# Patient Record
Sex: MALE | Race: WHITE | URBAN - METROPOLITAN AREA
[De-identification: names, ages, dates, MRNs, and addresses within clinical notes are randomized per-mention and may not be internally consistent; named-entity substitution may affect disease eponyms.]

---

## 2022-02-09 ENCOUNTER — TELEPHONE (OUTPATIENT)
Dept: CASE MANAGEMENT | Age: 67
End: 2022-02-09

## 2022-02-15 ENCOUNTER — OFFICE VISIT (OUTPATIENT)
Dept: FAMILY MEDICINE CLINIC | Facility: CLINIC | Age: 67
End: 2022-02-15
Payer: MEDICARE

## 2022-02-15 VITALS
BODY MASS INDEX: 25.34 KG/M2 | TEMPERATURE: 97 F | DIASTOLIC BLOOD PRESSURE: 98 MMHG | WEIGHT: 177 LBS | HEIGHT: 70 IN | RESPIRATION RATE: 17 BRPM | HEART RATE: 91 BPM | SYSTOLIC BLOOD PRESSURE: 162 MMHG

## 2022-02-15 DIAGNOSIS — Z12.11 COLON CANCER SCREENING: ICD-10-CM

## 2022-02-15 DIAGNOSIS — Z00.00 ENCOUNTER FOR ANNUAL HEALTH EXAMINATION: ICD-10-CM

## 2022-02-15 DIAGNOSIS — I10 ESSENTIAL HYPERTENSION: ICD-10-CM

## 2022-02-15 DIAGNOSIS — Z00.00 ROUTINE PHYSICAL EXAMINATION: Primary | ICD-10-CM

## 2022-02-15 PROCEDURE — 96160 PT-FOCUSED HLTH RISK ASSMT: CPT | Performed by: FAMILY MEDICINE

## 2022-02-15 PROCEDURE — 99387 INIT PM E/M NEW PAT 65+ YRS: CPT | Performed by: FAMILY MEDICINE

## 2022-02-15 PROCEDURE — 3077F SYST BP >= 140 MM HG: CPT | Performed by: FAMILY MEDICINE

## 2022-02-15 PROCEDURE — 3008F BODY MASS INDEX DOCD: CPT | Performed by: FAMILY MEDICINE

## 2022-02-15 PROCEDURE — 3080F DIAST BP >= 90 MM HG: CPT | Performed by: FAMILY MEDICINE

## 2022-02-15 PROCEDURE — G0402 INITIAL PREVENTIVE EXAM: HCPCS | Performed by: FAMILY MEDICINE

## 2022-02-18 ENCOUNTER — LAB ENCOUNTER (OUTPATIENT)
Dept: LAB | Age: 67
End: 2022-02-18
Attending: FAMILY MEDICINE
Payer: MEDICARE

## 2022-02-18 DIAGNOSIS — Z00.00 ROUTINE PHYSICAL EXAMINATION: ICD-10-CM

## 2022-02-18 DIAGNOSIS — R73.09 ELEVATED GLUCOSE: ICD-10-CM

## 2022-02-18 LAB
ALBUMIN SERPL-MCNC: 4.2 G/DL (ref 3.4–5)
ALBUMIN/GLOB SERPL: 1.3 {RATIO} (ref 1–2)
ALP LIVER SERPL-CCNC: 50 U/L
ALT SERPL-CCNC: 23 U/L
ANION GAP SERPL CALC-SCNC: 7 MMOL/L (ref 0–18)
AST SERPL-CCNC: 12 U/L (ref 15–37)
BILIRUB SERPL-MCNC: 0.6 MG/DL (ref 0.1–2)
BUN BLD-MCNC: 13 MG/DL (ref 7–18)
BUN/CREAT SERPL: 15.9 (ref 10–20)
CALCIUM BLD-MCNC: 9.5 MG/DL (ref 8.5–10.1)
CHLORIDE SERPL-SCNC: 102 MMOL/L (ref 98–112)
CHOLEST SERPL-MCNC: 295 MG/DL (ref ?–200)
CO2 SERPL-SCNC: 27 MMOL/L (ref 21–32)
COMPLEXED PSA SERPL-MCNC: 0.88 NG/ML (ref ?–4)
CREAT BLD-MCNC: 0.82 MG/DL
DEPRECATED RDW RBC AUTO: 37.3 FL (ref 35.1–46.3)
ERYTHROCYTE [DISTWIDTH] IN BLOOD BY AUTOMATED COUNT: 11 % (ref 11–15)
FASTING PATIENT LIPID ANSWER: YES
FASTING STATUS PATIENT QL REPORTED: YES
GLOBULIN PLAS-MCNC: 3.2 G/DL (ref 2.8–4.4)
GLUCOSE BLD-MCNC: 343 MG/DL (ref 70–99)
HCT VFR BLD AUTO: 43 %
HDLC SERPL-MCNC: 56 MG/DL (ref 40–59)
HGB BLD-MCNC: 15.2 G/DL
LDLC SERPL CALC-MCNC: 184 MG/DL (ref ?–100)
MCH RBC QN AUTO: 32.5 PG (ref 26–34)
MCHC RBC AUTO-ENTMCNC: 35.3 G/DL (ref 31–37)
MCV RBC AUTO: 91.9 FL
NONHDLC SERPL-MCNC: 239 MG/DL (ref ?–130)
OSMOLALITY SERPL CALC.SUM OF ELEC: 296 MOSM/KG (ref 275–295)
PLATELET # BLD AUTO: 224 10(3)UL (ref 150–450)
POTASSIUM SERPL-SCNC: 4.5 MMOL/L (ref 3.5–5.1)
PROT SERPL-MCNC: 7.4 G/DL (ref 6.4–8.2)
RBC # BLD AUTO: 4.68 X10(6)UL
SODIUM SERPL-SCNC: 136 MMOL/L (ref 136–145)
TRIGL SERPL-MCNC: 284 MG/DL (ref 30–149)
VLDLC SERPL CALC-MCNC: 60 MG/DL (ref 0–30)
WBC # BLD AUTO: 6 X10(3) UL (ref 4–11)

## 2022-02-18 PROCEDURE — 36415 COLL VENOUS BLD VENIPUNCTURE: CPT

## 2022-02-18 PROCEDURE — 85027 COMPLETE CBC AUTOMATED: CPT

## 2022-02-18 PROCEDURE — 83036 HEMOGLOBIN GLYCOSYLATED A1C: CPT

## 2022-02-18 PROCEDURE — 80053 COMPREHEN METABOLIC PANEL: CPT

## 2022-02-18 PROCEDURE — 80061 LIPID PANEL: CPT

## 2022-02-19 LAB
EST. AVERAGE GLUCOSE BLD GHB EST-MCNC: 321 MG/DL (ref 68–126)
HBA1C MFR BLD: 12.8 % (ref ?–5.7)

## 2022-02-23 ENCOUNTER — OFFICE VISIT (OUTPATIENT)
Dept: FAMILY MEDICINE CLINIC | Facility: CLINIC | Age: 67
End: 2022-02-23
Payer: MEDICARE

## 2022-02-23 VITALS
DIASTOLIC BLOOD PRESSURE: 78 MMHG | SYSTOLIC BLOOD PRESSURE: 138 MMHG | TEMPERATURE: 97 F | WEIGHT: 174 LBS | BODY MASS INDEX: 24.36 KG/M2 | HEIGHT: 71 IN | RESPIRATION RATE: 1 BRPM | HEART RATE: 75 BPM

## 2022-02-23 DIAGNOSIS — E11.9 NEW ONSET TYPE 2 DIABETES MELLITUS (HCC): Primary | ICD-10-CM

## 2022-02-23 DIAGNOSIS — R03.0 ELEVATED BLOOD PRESSURE READING: ICD-10-CM

## 2022-02-23 DIAGNOSIS — E78.00 ELEVATED CHOLESTEROL: ICD-10-CM

## 2022-02-23 PROCEDURE — 99213 OFFICE O/P EST LOW 20 MIN: CPT | Performed by: FAMILY MEDICINE

## 2022-02-23 PROCEDURE — 3075F SYST BP GE 130 - 139MM HG: CPT | Performed by: FAMILY MEDICINE

## 2022-02-23 PROCEDURE — 3078F DIAST BP <80 MM HG: CPT | Performed by: FAMILY MEDICINE

## 2022-02-23 PROCEDURE — 3008F BODY MASS INDEX DOCD: CPT | Performed by: FAMILY MEDICINE

## 2022-05-04 ENCOUNTER — OFFICE VISIT (OUTPATIENT)
Dept: ENDOCRINOLOGY CLINIC | Facility: CLINIC | Age: 67
End: 2022-05-04
Payer: MEDICARE

## 2022-05-04 ENCOUNTER — TELEPHONE (OUTPATIENT)
Dept: ENDOCRINOLOGY CLINIC | Facility: CLINIC | Age: 67
End: 2022-05-04

## 2022-05-04 VITALS
DIASTOLIC BLOOD PRESSURE: 101 MMHG | SYSTOLIC BLOOD PRESSURE: 166 MMHG | BODY MASS INDEX: 24.78 KG/M2 | RESPIRATION RATE: 18 BRPM | WEIGHT: 177 LBS | HEIGHT: 71 IN | HEART RATE: 91 BPM

## 2022-05-04 DIAGNOSIS — E11.65 TYPE 2 DIABETES MELLITUS WITH HYPERGLYCEMIA, WITHOUT LONG-TERM CURRENT USE OF INSULIN (HCC): Primary | ICD-10-CM

## 2022-05-04 DIAGNOSIS — E78.5 DYSLIPIDEMIA: ICD-10-CM

## 2022-05-04 DIAGNOSIS — I10 HYPERTENSION, UNSPECIFIED TYPE: ICD-10-CM

## 2022-05-04 LAB
CARTRIDGE LOT#: ABNORMAL NUMERIC
GLUCOSE BLOOD: 173
HEMOGLOBIN A1C: 9.9 % (ref 4.3–5.6)
TEST STRIP LOT #: NORMAL NUMERIC

## 2022-05-04 PROCEDURE — 3046F HEMOGLOBIN A1C LEVEL >9.0%: CPT | Performed by: FAMILY MEDICINE

## 2022-05-04 NOTE — TELEPHONE ENCOUNTER
Hi!  Can we please send this patient an glucometer with testing supplies so that he can check his blood sugars twice daily? Thank you!

## 2022-05-05 ENCOUNTER — OFFICE VISIT (OUTPATIENT)
Dept: FAMILY MEDICINE CLINIC | Facility: CLINIC | Age: 67
End: 2022-05-05
Payer: MEDICARE

## 2022-05-05 VITALS
HEART RATE: 86 BPM | BODY MASS INDEX: 25.2 KG/M2 | DIASTOLIC BLOOD PRESSURE: 88 MMHG | SYSTOLIC BLOOD PRESSURE: 142 MMHG | TEMPERATURE: 97 F | WEIGHT: 180 LBS | HEIGHT: 71 IN | RESPIRATION RATE: 18 BRPM

## 2022-05-05 DIAGNOSIS — E78.00 ELEVATED CHOLESTEROL: ICD-10-CM

## 2022-05-05 DIAGNOSIS — R03.0 ELEVATED BLOOD PRESSURE READING: ICD-10-CM

## 2022-05-05 DIAGNOSIS — E11.65 TYPE 2 DIABETES MELLITUS WITH HYPERGLYCEMIA, WITHOUT LONG-TERM CURRENT USE OF INSULIN (HCC): Primary | ICD-10-CM

## 2022-05-05 PROCEDURE — 3079F DIAST BP 80-89 MM HG: CPT | Performed by: FAMILY MEDICINE

## 2022-05-05 PROCEDURE — 99213 OFFICE O/P EST LOW 20 MIN: CPT | Performed by: FAMILY MEDICINE

## 2022-05-05 PROCEDURE — 3077F SYST BP >= 140 MM HG: CPT | Performed by: FAMILY MEDICINE

## 2022-05-05 PROCEDURE — 3008F BODY MASS INDEX DOCD: CPT | Performed by: FAMILY MEDICINE

## 2022-05-05 RX ORDER — BLOOD SUGAR DIAGNOSTIC
STRIP MISCELLANEOUS
COMMUNITY
Start: 2022-05-04

## 2022-05-05 RX ORDER — LANCETS
EACH MISCELLANEOUS
COMMUNITY
Start: 2022-05-04

## 2022-05-06 ENCOUNTER — TELEPHONE (OUTPATIENT)
Dept: FAMILY MEDICINE CLINIC | Facility: CLINIC | Age: 67
End: 2022-05-06

## 2022-05-07 NOTE — TELEPHONE ENCOUNTER
Left vm with 9 [facility] that patient is being managed by Endo. Contacted patient, he confirms he did contact [employer] for assistance with a meter he wanted but he does not qualify. Patient confirms his Endocrinologist sent scripts to pharmacy for his meter and supplies, no further action needed.
No answer, Left message to call back.
Per Siria Archer, need to know if patient is using insuline injection so that they can send patient a work order for Glucose   Monitor that patient is asking.
No risk alerts present

## 2022-05-11 ENCOUNTER — PATIENT OUTREACH (OUTPATIENT)
Dept: CASE MANAGEMENT | Age: 67
End: 2022-05-11

## 2022-08-14 DIAGNOSIS — E11.65 TYPE 2 DIABETES MELLITUS WITH HYPERGLYCEMIA, WITHOUT LONG-TERM CURRENT USE OF INSULIN (HCC): ICD-10-CM

## 2022-09-08 ENCOUNTER — APPOINTMENT (OUTPATIENT)
Dept: GENERAL RADIOLOGY | Facility: HOSPITAL | Age: 67
DRG: 065 | End: 2022-09-08
Attending: EMERGENCY MEDICINE

## 2022-09-08 ENCOUNTER — APPOINTMENT (OUTPATIENT)
Dept: CT IMAGING | Facility: HOSPITAL | Age: 67
DRG: 065 | End: 2022-09-08
Attending: EMERGENCY MEDICINE

## 2022-09-08 ENCOUNTER — APPOINTMENT (OUTPATIENT)
Dept: ULTRASOUND IMAGING | Facility: HOSPITAL | Age: 67
DRG: 065 | End: 2022-09-08
Attending: Other

## 2022-09-08 ENCOUNTER — HOSPITAL ENCOUNTER (INPATIENT)
Facility: HOSPITAL | Age: 67
LOS: 5 days | Discharge: NURSING, REHAB OR LONG-TERM CARE | DRG: 065 | End: 2022-09-13
Attending: EMERGENCY MEDICINE | Admitting: HOSPITALIST

## 2022-09-08 DIAGNOSIS — I16.0 HYPERTENSIVE URGENCY: ICD-10-CM

## 2022-09-08 DIAGNOSIS — I63.9 ACUTE CVA (CEREBROVASCULAR ACCIDENT) (HCC): Primary | ICD-10-CM

## 2022-09-08 LAB
ANION GAP SERPL CALC-SCNC: 8 MMOL/L (ref 0–18)
BASOPHILS # BLD AUTO: 0.04 X10(3) UL (ref 0–0.2)
BASOPHILS NFR BLD AUTO: 0.7 %
BUN BLD-MCNC: 12 MG/DL (ref 7–18)
BUN/CREAT SERPL: 15.2 (ref 10–20)
CALCIUM BLD-MCNC: 9.6 MG/DL (ref 8.5–10.1)
CHLORIDE SERPL-SCNC: 103 MMOL/L (ref 98–112)
CHOLEST SERPL-MCNC: 254 MG/DL (ref ?–200)
CO2 SERPL-SCNC: 29 MMOL/L (ref 21–32)
CREAT BLD-MCNC: 0.79 MG/DL
DEPRECATED RDW RBC AUTO: 40.1 FL (ref 35.1–46.3)
EOSINOPHIL # BLD AUTO: 0.07 X10(3) UL (ref 0–0.7)
EOSINOPHIL NFR BLD AUTO: 1.2 %
ERYTHROCYTE [DISTWIDTH] IN BLOOD BY AUTOMATED COUNT: 11.6 % (ref 11–15)
EST. AVERAGE GLUCOSE BLD GHB EST-MCNC: 143 MG/DL (ref 68–126)
GFR SERPLBLD BASED ON 1.73 SQ M-ARVRAT: 99 ML/MIN/1.73M2 (ref 60–?)
GLUCOSE BLD-MCNC: 160 MG/DL (ref 70–99)
GLUCOSE BLDC GLUCOMTR-MCNC: 108 MG/DL (ref 70–99)
GLUCOSE BLDC GLUCOMTR-MCNC: 145 MG/DL (ref 70–99)
GLUCOSE BLDC GLUCOMTR-MCNC: 176 MG/DL (ref 70–99)
GLUCOSE BLDC GLUCOMTR-MCNC: 199 MG/DL (ref 70–99)
HBA1C MFR BLD: 6.6 % (ref ?–5.7)
HCT VFR BLD AUTO: 44.4 %
HDLC SERPL-MCNC: 58 MG/DL (ref 40–59)
HGB BLD-MCNC: 15.1 G/DL
IMM GRANULOCYTES # BLD AUTO: 0.01 X10(3) UL (ref 0–1)
IMM GRANULOCYTES NFR BLD: 0.2 %
LDLC SERPL CALC-MCNC: 160 MG/DL (ref ?–100)
LYMPHOCYTES # BLD AUTO: 1.24 X10(3) UL (ref 1–4)
LYMPHOCYTES NFR BLD AUTO: 20.8 %
MCH RBC QN AUTO: 32.1 PG (ref 26–34)
MCHC RBC AUTO-ENTMCNC: 34 G/DL (ref 31–37)
MCV RBC AUTO: 94.3 FL
MONOCYTES # BLD AUTO: 0.52 X10(3) UL (ref 0.1–1)
MONOCYTES NFR BLD AUTO: 8.7 %
NEUTROPHILS # BLD AUTO: 4.07 X10 (3) UL (ref 1.5–7.7)
NEUTROPHILS # BLD AUTO: 4.07 X10(3) UL (ref 1.5–7.7)
NEUTROPHILS NFR BLD AUTO: 68.4 %
NONHDLC SERPL-MCNC: 196 MG/DL (ref ?–130)
OSMOLALITY SERPL CALC.SUM OF ELEC: 293 MOSM/KG (ref 275–295)
PLATELET # BLD AUTO: 291 10(3)UL (ref 150–450)
POTASSIUM SERPL-SCNC: 3.4 MMOL/L (ref 3.5–5.1)
RBC # BLD AUTO: 4.71 X10(6)UL
SARS-COV-2 RNA RESP QL NAA+PROBE: NOT DETECTED
SODIUM SERPL-SCNC: 140 MMOL/L (ref 136–145)
TRIGL SERPL-MCNC: 198 MG/DL (ref 30–149)
VLDLC SERPL CALC-MCNC: 39 MG/DL (ref 0–30)
WBC # BLD AUTO: 6 X10(3) UL (ref 4–11)

## 2022-09-08 PROCEDURE — 70498 CT ANGIOGRAPHY NECK: CPT | Performed by: EMERGENCY MEDICINE

## 2022-09-08 PROCEDURE — 3044F HG A1C LEVEL LT 7.0%: CPT | Performed by: FAMILY MEDICINE

## 2022-09-08 PROCEDURE — 70450 CT HEAD/BRAIN W/O DYE: CPT | Performed by: EMERGENCY MEDICINE

## 2022-09-08 PROCEDURE — 99223 1ST HOSP IP/OBS HIGH 75: CPT | Performed by: HOSPITALIST

## 2022-09-08 PROCEDURE — 70496 CT ANGIOGRAPHY HEAD: CPT | Performed by: EMERGENCY MEDICINE

## 2022-09-08 PROCEDURE — 71045 X-RAY EXAM CHEST 1 VIEW: CPT | Performed by: EMERGENCY MEDICINE

## 2022-09-08 PROCEDURE — 99223 1ST HOSP IP/OBS HIGH 75: CPT | Performed by: OTHER

## 2022-09-08 PROCEDURE — 93880 EXTRACRANIAL BILAT STUDY: CPT | Performed by: OTHER

## 2022-09-08 RX ORDER — LABETALOL HYDROCHLORIDE 5 MG/ML
10 INJECTION, SOLUTION INTRAVENOUS EVERY 10 MIN PRN
Status: DISCONTINUED | OUTPATIENT
Start: 2022-09-08 | End: 2022-09-13

## 2022-09-08 RX ORDER — ONDANSETRON 2 MG/ML
4 INJECTION INTRAMUSCULAR; INTRAVENOUS EVERY 6 HOURS PRN
Status: DISCONTINUED | OUTPATIENT
Start: 2022-09-08 | End: 2022-09-13

## 2022-09-08 RX ORDER — DEXTROSE MONOHYDRATE 25 G/50ML
50 INJECTION, SOLUTION INTRAVENOUS
Status: DISCONTINUED | OUTPATIENT
Start: 2022-09-08 | End: 2022-09-13

## 2022-09-08 RX ORDER — NICOTINE POLACRILEX 4 MG
15 LOZENGE BUCCAL
Status: DISCONTINUED | OUTPATIENT
Start: 2022-09-08 | End: 2022-09-13

## 2022-09-08 RX ORDER — ASPIRIN 300 MG/1
300 SUPPOSITORY RECTAL ONCE
Status: COMPLETED | OUTPATIENT
Start: 2022-09-08 | End: 2022-09-08

## 2022-09-08 RX ORDER — ACETAMINOPHEN 325 MG/1
650 TABLET ORAL EVERY 4 HOURS PRN
Status: DISCONTINUED | OUTPATIENT
Start: 2022-09-08 | End: 2022-09-13

## 2022-09-08 RX ORDER — METOCLOPRAMIDE HYDROCHLORIDE 5 MG/ML
10 INJECTION INTRAMUSCULAR; INTRAVENOUS EVERY 8 HOURS PRN
Status: DISCONTINUED | OUTPATIENT
Start: 2022-09-08 | End: 2022-09-13

## 2022-09-08 RX ORDER — ONDANSETRON 2 MG/ML
4 INJECTION INTRAMUSCULAR; INTRAVENOUS EVERY 6 HOURS PRN
Status: DISCONTINUED | OUTPATIENT
Start: 2022-09-08 | End: 2022-09-08

## 2022-09-08 RX ORDER — DEXTROSE, SODIUM CHLORIDE, AND POTASSIUM CHLORIDE 5; .45; .15 G/100ML; G/100ML; G/100ML
INJECTION INTRAVENOUS CONTINUOUS
Status: DISCONTINUED | OUTPATIENT
Start: 2022-09-08 | End: 2022-09-10

## 2022-09-08 RX ORDER — ASPIRIN 325 MG
325 TABLET ORAL DAILY
Status: DISCONTINUED | OUTPATIENT
Start: 2022-09-09 | End: 2022-09-12

## 2022-09-08 RX ORDER — HEPARIN SODIUM 5000 [USP'U]/ML
5000 INJECTION, SOLUTION INTRAVENOUS; SUBCUTANEOUS EVERY 12 HOURS SCHEDULED
Status: DISCONTINUED | OUTPATIENT
Start: 2022-09-08 | End: 2022-09-13

## 2022-09-08 RX ORDER — NICOTINE POLACRILEX 4 MG
30 LOZENGE BUCCAL
Status: DISCONTINUED | OUTPATIENT
Start: 2022-09-08 | End: 2022-09-13

## 2022-09-08 RX ORDER — SODIUM CHLORIDE 9 MG/ML
INJECTION, SOLUTION INTRAVENOUS CONTINUOUS
Status: ACTIVE | OUTPATIENT
Start: 2022-09-08 | End: 2022-09-10

## 2022-09-08 RX ORDER — ATORVASTATIN CALCIUM 80 MG/1
80 TABLET, FILM COATED ORAL NIGHTLY
Status: DISCONTINUED | OUTPATIENT
Start: 2022-09-08 | End: 2022-09-13

## 2022-09-08 RX ORDER — ASPIRIN 81 MG/1
81 TABLET, CHEWABLE ORAL ONCE
Status: DISCONTINUED | OUTPATIENT
Start: 2022-09-08 | End: 2022-09-08

## 2022-09-08 RX ORDER — METOCLOPRAMIDE HYDROCHLORIDE 5 MG/ML
10 INJECTION INTRAMUSCULAR; INTRAVENOUS EVERY 8 HOURS PRN
Status: DISCONTINUED | OUTPATIENT
Start: 2022-09-08 | End: 2022-09-08

## 2022-09-08 RX ORDER — ACETAMINOPHEN 500 MG
500 TABLET ORAL EVERY 4 HOURS PRN
Status: DISCONTINUED | OUTPATIENT
Start: 2022-09-08 | End: 2022-09-13

## 2022-09-08 RX ORDER — ACETAMINOPHEN 650 MG/1
650 SUPPOSITORY RECTAL EVERY 4 HOURS PRN
Status: DISCONTINUED | OUTPATIENT
Start: 2022-09-08 | End: 2022-09-13

## 2022-09-08 RX ORDER — HYDRALAZINE HYDROCHLORIDE 20 MG/ML
10 INJECTION INTRAMUSCULAR; INTRAVENOUS EVERY 2 HOUR PRN
Status: DISCONTINUED | OUTPATIENT
Start: 2022-09-08 | End: 2022-09-13

## 2022-09-08 RX ORDER — ASPIRIN 300 MG/1
300 SUPPOSITORY RECTAL DAILY
Status: DISCONTINUED | OUTPATIENT
Start: 2022-09-09 | End: 2022-09-12

## 2022-09-08 NOTE — H&P
[facility]    PATIENT'S NAME: Brooklyn Bernstein PHYSICIAN: Kellee Nathan MD   PATIENT ACCOUNT#:   [id number]    LOCATION:  [address] [place] [address] RECORD #:   [id number]       YOB: 1955  ADMISSION DATE:       09/08/2022    HISTORY AND PHYSICAL EXAMINATION    CHIEF COMPLAINT:  Acute cerebrovascular accident. HISTORY OF PRESENT ILLNESS:  The patient is a 61-year-old  male who was noted by his wife to have slurred speech and right upper and lower extremity weakness yesterday. Today she convinced him to come into the emergency department for evaluation. CBC and chemistry are unremarkable except for potassium 3.4 and glucose 160. Chest x-ray showed no acute findings. EKG showed normal sinus rhythm. CT scan of the brain for acute stroke alert, acute stroke was negative. CT angiogram of the head and neck showed no flow-limiting stenosis involving left or right middle cerebral arteries, focal moderate to severe stenosis involving V4 segment of the left vertebral artery. There is also focal moderate stenosis of P2-P3 segment junction of the right posterior cerebral artery. These findings may be related to intracranial atherosclerosis. Otherwise, no atherosclerosis involving bilateral carotid arteries. The patient was given rectal aspirin. He failed swallow screen in the emergency room. He will be admitted to hospital for further management. PAST MEDICAL HISTORY:  Diabetes mellitus type 2. The patient denies any other medical problems. PAST SURGICAL HISTORY:  Right inguinal hernia repair. MEDICATIONS:  Only metformin. Does not take aspirin on daily basis. ALLERGIES:  No known drug allergies. FAMILY HISTORY:  Positive for heart disease for both parents. SOCIAL HISTORY:  Drinks 3 to 6 beers on a daily basis. Does not smoke or use drugs. Lives with his wife.     REVIEW OF SYSTEMS:  The patient reports slurred speech starting yesterday around noon and started noticing right upper and lower extremity weakness that progressed overnight. He denies any headache. No blurred vision, no chest pain or abdominal pain. Other 12-point review of systems negative. PHYSICAL EXAMINATION:    GENERAL:  Alert. Oriented to time, place, and person. Speech is fluent but slurred. VITAL SIGNS:  Temperature 97.9, pulse 76, respiratory rate 19, blood pressure 178/97, pulse ox 96% on room air. HEENT:  Atraumatic. Oropharynx clear, dry mucous membranes. Normal hard and soft palate. Eyes:  Anicteric sclerae. Right nasolabial droop noted. NECK:  Supple. No lymphadenopathy. Trachea midline. Full range of motion. LUNGS:  Clear to auscultation bilaterally. Normal respiratory effort. HEART:  Regular rate and rhythm. S1, S2 auscultated. No murmur. ABDOMEN:  Soft, nondistended, no tenderness. Positive bowel sounds. EXTREMITIES:  No peripheral edema, clubbing, or cyanosis. NEUROLOGIC:  Motor right upper and lower extremity weakness. Sensory intact. ASSESSMENT:    1. Acute cerebrovascular accident. Possible left cortical.  2.   Hypertension versus reactive hypertension to cerebrovascular accident. 3.   Diabetes mellitus type 2. PLAN:  The patient will be admitted to telemetry floor, n.p.o., monitor Accu-Cheks, daily aspirin, MRI scan of the brain, 2D echocardiogram with Doppler, aspiration precautions, physical and speech therapy evaluations, neurology consult, fasting lipid profile. Further recommendations to follow.     Dictated By James Cheng MD  d: 09/08/2022 11:51:36  t: 09/08/2022 12:32:50  Job [id number]  FB/

## 2022-09-08 NOTE — ED QUICK NOTES
Orders for admission, patient is aware of plan and ready to go upstairs. Any questions, please call ED RN laney at extension [phone].      Patient Covid vaccination status: Fully vaccinated     COVID Test Ordered in ED: Rapid SARS-CoV-2 by PCR    COVID Suspicion at Admission: Low clinical suspicion for COVID    Running Infusions:  None    Mental Status/LOC at time of transport: x4    Other pertinent information:   CIWA score: N/A   NIH score:  3

## 2022-09-08 NOTE — ED INITIAL ASSESSMENT (HPI)
Reports stroke like symptoms at 11 afternoon. Reports slurred speech, right leg & arm weakness. Denies previous strokes, no blood thinnger. No facial droop noted to right side, slurred speech noted.         Stroke alert called at 930

## 2022-09-09 ENCOUNTER — APPOINTMENT (OUTPATIENT)
Dept: MRI IMAGING | Facility: HOSPITAL | Age: 67
DRG: 065 | End: 2022-09-09
Attending: Other

## 2022-09-09 ENCOUNTER — APPOINTMENT (OUTPATIENT)
Dept: GENERAL RADIOLOGY | Facility: HOSPITAL | Age: 67
DRG: 065 | End: 2022-09-09
Attending: HOSPITALIST

## 2022-09-09 ENCOUNTER — APPOINTMENT (OUTPATIENT)
Dept: CV DIAGNOSTICS | Facility: HOSPITAL | Age: 67
DRG: 065 | End: 2022-09-09
Attending: Other

## 2022-09-09 LAB
ANION GAP SERPL CALC-SCNC: 7 MMOL/L (ref 0–18)
BASOPHILS # BLD AUTO: 0.05 X10(3) UL (ref 0–0.2)
BASOPHILS NFR BLD AUTO: 0.9 %
BUN BLD-MCNC: 11 MG/DL (ref 7–18)
BUN/CREAT SERPL: 15.9 (ref 10–20)
CALCIUM BLD-MCNC: 9.1 MG/DL (ref 8.5–10.1)
CHLORIDE SERPL-SCNC: 109 MMOL/L (ref 98–112)
CO2 SERPL-SCNC: 26 MMOL/L (ref 21–32)
CREAT BLD-MCNC: 0.69 MG/DL
DEPRECATED RDW RBC AUTO: 39.6 FL (ref 35.1–46.3)
EOSINOPHIL # BLD AUTO: 0.13 X10(3) UL (ref 0–0.7)
EOSINOPHIL NFR BLD AUTO: 2.3 %
ERYTHROCYTE [DISTWIDTH] IN BLOOD BY AUTOMATED COUNT: 11.6 % (ref 11–15)
GFR SERPLBLD BASED ON 1.73 SQ M-ARVRAT: 103 ML/MIN/1.73M2 (ref 60–?)
GLUCOSE BLD-MCNC: 141 MG/DL (ref 70–99)
GLUCOSE BLDC GLUCOMTR-MCNC: 154 MG/DL (ref 70–99)
GLUCOSE BLDC GLUCOMTR-MCNC: 158 MG/DL (ref 70–99)
GLUCOSE BLDC GLUCOMTR-MCNC: 164 MG/DL (ref 70–99)
GLUCOSE BLDC GLUCOMTR-MCNC: 176 MG/DL (ref 70–99)
HCT VFR BLD AUTO: 43.2 %
HGB BLD-MCNC: 15.2 G/DL
IMM GRANULOCYTES # BLD AUTO: 0.01 X10(3) UL (ref 0–1)
IMM GRANULOCYTES NFR BLD: 0.2 %
LYMPHOCYTES # BLD AUTO: 1.78 X10(3) UL (ref 1–4)
LYMPHOCYTES NFR BLD AUTO: 31.1 %
MCH RBC QN AUTO: 32.8 PG (ref 26–34)
MCHC RBC AUTO-ENTMCNC: 35.2 G/DL (ref 31–37)
MCV RBC AUTO: 93.1 FL
MONOCYTES # BLD AUTO: 0.62 X10(3) UL (ref 0.1–1)
MONOCYTES NFR BLD AUTO: 10.8 %
NEUTROPHILS # BLD AUTO: 3.13 X10 (3) UL (ref 1.5–7.7)
NEUTROPHILS # BLD AUTO: 3.13 X10(3) UL (ref 1.5–7.7)
NEUTROPHILS NFR BLD AUTO: 54.7 %
OSMOLALITY SERPL CALC.SUM OF ELEC: 296 MOSM/KG (ref 275–295)
PLATELET # BLD AUTO: 247 10(3)UL (ref 150–450)
POTASSIUM SERPL-SCNC: 3.5 MMOL/L (ref 3.5–5.1)
RBC # BLD AUTO: 4.64 X10(6)UL
SODIUM SERPL-SCNC: 142 MMOL/L (ref 136–145)
WBC # BLD AUTO: 5.7 X10(3) UL (ref 4–11)

## 2022-09-09 PROCEDURE — 93306 TTE W/DOPPLER COMPLETE: CPT | Performed by: OTHER

## 2022-09-09 PROCEDURE — 99233 SBSQ HOSP IP/OBS HIGH 50: CPT | Performed by: HOSPITALIST

## 2022-09-09 PROCEDURE — 99232 SBSQ HOSP IP/OBS MODERATE 35: CPT | Performed by: OTHER

## 2022-09-09 PROCEDURE — 74230 X-RAY XM SWLNG FUNCJ C+: CPT | Performed by: HOSPITALIST

## 2022-09-09 PROCEDURE — 70551 MRI BRAIN STEM W/O DYE: CPT | Performed by: OTHER

## 2022-09-09 NOTE — PLAN OF CARE
Pt went for MRI of brain, 2D echo, and video swallow. Diet modified to chopped and bite sized, thin liquids via tsp. Neuro checks continued q. 4 with daily NIH. PT recommending acute rehab for discharge. Problem: Diabetes/Glucose Control  Goal: Glucose maintained within prescribed range  Description: INTERVENTIONS:  - Monitor Blood Glucose as ordered  - Assess for signs and symptoms of hyperglycemia and hypoglycemia  - Administer ordered medications to maintain glucose within target range  - Assess barriers to adequate nutritional intake and initiate nutrition consult as needed  - Instruct patient on self management of diabetes  9/9/2022 1550 by Astrid Toney RN  Outcome: Progressing  9/9/2022 1548 by Astrid Toney RN  Outcome: Progressing     Problem: NEUROLOGICAL - ADULT  Goal: Achieves stable or improved neurological status  Description: INTERVENTIONS  - Assess for and report changes in neurological status  - Initiate measures to prevent increased intracranial pressure  - Maintain blood pressure and fluid volume within ordered parameters to optimize cerebral perfusion and minimize risk of hemorrhage  - Monitor temperature, glucose, and sodium.  Initiate appropriate interventions as ordered  9/9/2022 1550 by Astrid Toney RN  Outcome: Progressing  9/9/2022 1548 by Astrid Toney RN  Outcome: Progressing  Goal: Achieves maximal functionality and self care  Description: INTERVENTIONS  - Monitor swallowing and airway patency with patient fatigue and changes in neurological status  - Encourage and assist patient to increase activity and self care with guidance from PT/OT  - Encourage visually impaired, hearing impaired and aphasic patients to use assistive/communication devices  9/9/2022 1550 by Astrid Toney RN  Outcome: Progressing  9/9/2022 1548 by Astrid Toney RN  Outcome: Progressing     Problem: SAFETY ADULT - FALL  Goal: Free from fall injury  Description: INTERVENTIONS:  - Assess pt frequently for physical needs  - Identify cognitive and physical deficits and behaviors that affect risk of falls.   - [place] fall precautions as indicated by assessment.  - Educate pt/family on patient safety including physical limitations  - Instruct pt to call for assistance with activity based on assessment  - Modify environment to reduce risk of injury  - Provide assistive devices as appropriate  - Consider OT/PT consult to assist with strengthening/mobility  - Encourage toileting schedule  Outcome: Progressing     Problem: Potential for alteration in hemodynamic status  Goal: Vital signs remain stable  Outcome: Progressing

## 2022-09-09 NOTE — CM/SW NOTE
Department  notified of request for Acute Rehab, aidin referrals started. Assigned CM/SW to follow up with pt/family on further discharge planning.        Nuria Mullins   September 09, 2022   16:18

## 2022-09-09 NOTE — SLP NOTE
SPEECH/LANGUAGE/COGNITIVE EVALUATION - INPATIENT    Admission Date: 9/8/2022  Evaluation Date: 09/09/22    Reason for Referral: Stroke protocol;RN dysphagia screen    ASSESSMENT & PLAN   ASSESSMENT & IMPRESSION    PPE REQUIRED. THIS SLP WORE GLOVES AND DROPLET MASK. HANDS SANITIZED/WASHED UPON ENTRANCE/EXIT. Pt admitted 9/08/22 with acute CVA. Pt presenting with (R) facial droop at admission. Pt resides at home with spouse. No past hx of speech services at [facility] prior to admission. Spouse present in room. A Brain MRI has been ordered. Brain CT 9/08/22:  CONCLUSION:   1. No evidence of acute intracranial hemorrhage or extended signs of acute large vessel infarction. 2. Intracranial atherosclerosis. 3. Likely incidental curvilinear calcification along the left inferomedial cerebellum. 4. Lesser incidental findings as above. Pt admitted with \"slurred speech\". Pt was assessed with the Oral Expression Subtest of the BDAE. Results were as follows:                   Non-verbal agility              7/12                 Verbal agility                     7/14                 Word repetition                 10/10                 Sentence repetition          4/5    Pt with mildly reduced reduced labial and lingual skills on the (R) for rate, ROM and strength. Articulation accuracy decreased as length of utterance increased. Imprecise consonant production most noted for fricative, plosive and sibilant phonemes. Vocal intensity was reduced and vocal quality judged to be mildly hoarse. Spouse reported \"sometimes I can't understand his speech' in reference to Pt. Sharmaine Ross IMPRESSIONS: Pt presents with mild-moderate dysarthria. In conversation, Pt's speech intelligibilty is judged to be 75% with unfamiliar listener and unknown content. Per [facility] Scale, Pt's Motor Speech Production is Level 4 of 7. Collaborated with RN regarding Pt's plan of care.      PLAN: Treatment to focus on training OME, verbal agility drills and speech compensatory strategies to improve speech intelligibility in all contexts. Speech evaluation results/recommendations discussed with Pt and spouse; v/u.      Discharge Recommendations/Plan: Undetermined    Patient Experiencing Pain: No  FCM Score: 5   FCM Impression: Abnormal     GOALS  Goal #1 The patient will complete OMEs targeting Lingual, Labial and Buccal strength, ROM, and coordination with 90 % accuracy within 4 session(s). In Progress   Goal #2 Pt to complete verbal agility drills of diadochokinetic exercises with 90% accuracy within 4 sessions. In Progress   Goal #3 Pt to use trained speech compensatory strategies (slow rate, exaggerated articulation, pausing between words) during repetition of functional sentences containing 5-7  words with 90% accuracy within 4 sessions. In Progress   Goal #4 The patient will use trained speech compensatory strategies of SLOB (slow speech, loud speech, overarticulated/open mouth speech, breath-pause to breath between words) with 90% accuracy within 4 sessions during conversation. In Progress   Prior Living Situation: Home with spouse  Prior Level of Function: Independent      Patient/Family Goals: to go home    Interdisciplinary Communication: Discussed with RN    Patient, family and/or caregiver has been informed and has taken part in this evaluation and plan of treatment and have been advised and agree on the findings and goals.       FOLLOW UP  Treatment Plan/Recommendations: Aspiration precautions  Number of Visits to Meet Established Goals:  (4 dysphagia/dsyarthria)  Follow Up Needed (Documentation Required): Yes  SLP Follow-up Date: 09/10/22    Thank you for your referral.  If you have any questions please contact   Lea Arora M.S. PATI/SLP  Speech-Language Pathologist  [facility]  #[phone]

## 2022-09-09 NOTE — SLP NOTE
SPEECH DAILY NOTE - INPATIENT    ASSESSMENT & PLAN   ASSESSMENT    Proper PPE worn. Hands sanitized upon entrance/exit Pt room. Pt alert, afebrile and on room air. Pt seen for swallow analysis per BSE recommendations (after consulting with RN). Spouse present. Pt seen upright in chair with current diet of pureed/mildly thick liquids for monitoring diet tolerance. Additionally, Pt seen with trials of solid and thin liquids for candidacy of upgrade of diet. Swallowing precautions/strategies discussed; mild cues needed for execution. Increased mastication skills on soft solids with slow lingual bolus formation. Mild oral retention on the (R) cleared with SLP cued liquid wash. Pharyngeal response appeared slightly delayed per hyolaryngeal elevation to completion (functional rise/strength per palpation). No overt clinical signs of aspiration on swallows of soft solid, pureed nor mildly thick liquids (no coughing, no throat clearing, clear vocal quality and no SOB). Throat clearing observed on controlled thin liquid swallows with eventual weak cough. Rec diet upgrade to BITE SIZE food/remain on mildly thick liquids with VFSS to be completed prior to upgrade to thin. Collaborated with RN regarding Pt's swallowing plan of care. Per RN, Pt with good tolerance of current diet. No report of difficulty taking meds. Sp02 ~94% during this session. CXR 9/08/22:  CONCLUSION:   1. No acute cardiopulmonary disease         PLAN:  VFSS is recommended to further objectively assess pharyngeal function to determine safest least restrictive diet given clinical observations and admission with CVA. Diet Recommendations - Solids: Puree  Diet Recommendations - Liquids: Nectar thick liquids/ Mildly thick    Compensatory Strategies Recommended: No straws;Small bites and sips; Slow rate  Aspiration Precautions: Upright position; Slow rate;Small bites and sips; No straw  Medication Administration Recommendations: Crushed in puree;Whole in puree  Patient Experiencing Pain: No  Discharge Recommendations  Discharge Recommendations/Plan: Undetermined  Treatment Plan  Treatment Plan/Recommendations: Aspiration precautions  Interdisciplinary Communication: Discussed with RN    GOALS  Goal #1 The patient will tolerate puree consistency and mildly thick liquids without overt signs or symptoms of aspiration with 100 % accuracy over 1-2 session(s). No CSA (no cough, no throat clear, clear vocal quality and no SOB) on swallows of pureed nor mildly thick liquids during this first session for 100% of trials. GOAL MET      Goal #2 The patient/family/caregiver will demonstrate understanding and implementation of aspiration precautions and swallow strategies independently over 1-2 session(s). Reviewed swallowing precautions/strategies with Pt and spouse; spouse v/u. Reinforcement needed for Pt. Swallowing precautions written on board in room. In Progress   Goal #3 The patient will utilize compensatory strategies as outlined by  BSSE (clinical evaluation) including Slow rate, Small bites, Small sips, No straws, alternating consistencies, Upright 90 degrees with min assistance 100 % of the time across 2 sessions. Pt upright in chair, self-fed oral trials. Swallowing precautions discussed/demonstrated. Mild cues for alternating consistencies and controlled amounts. No straws used. Pt would benefit from additional reinforcement of aspiration precautions. In Progress   Goal #4 The patient will tolerate trial upgrade of minced moist/soft solids consistency and thin liquids without overt signs or symptoms of aspiration with 100% accuracy over 1-2 session(s). Increased mastication time/effort on soft solids. No CSA on trials of soft solids. Throat clearing progressed to weak cough S/P controlled thin liquids. Rec upgrade diet to BITE SIZE consistency/remain on mildly thick liquids. VFSS to be completed.       In Progress   FOLLOW UP  Follow Up Needed (Documentation Required): Yes  SLP Follow-up Date: 09/09/22  Number of Visits to Meet Established Goals: 3    Session: 1 S/P BSE    If you have any questions, please contact   CYNDIE Ruiz [facility]  #[phone]

## 2022-09-09 NOTE — PLAN OF CARE
Pt is alert and oriented x4, neuro q. 2 and NIH done. Pt is now on modified diet - pureed with mildly thickened liquids. Plan for MRI and 2D echo tomorrow.     Problem: Patient Centered Care  Goal: Patient preferences are identified and integrated in the patient's plan of care  Description: Interventions:  - What would you like us to know as we care for you?   - Provide timely, complete, and accurate information to patient/family  - Incorporate patient and family knowledge, values, beliefs, and cultural backgrounds into the planning and delivery of care  - Encourage patient/family to participate in care and decision-making at the level they choose  - Honor patient and family perspectives and choices  Outcome: Progressing     Problem: Diabetes/Glucose Control  Goal: Glucose maintained within prescribed range  Description: INTERVENTIONS:  - Monitor Blood Glucose as ordered  - Assess for signs and symptoms of hyperglycemia and hypoglycemia  - Administer ordered medications to maintain glucose within target range  - Assess barriers to adequate nutritional intake and initiate nutrition consult as needed  - Instruct patient on self management of diabetes  Outcome: Progressing     Problem: Patient/Family Goals  Goal: Patient/Family Long Term Goal  Description: Patient's Long Term Goal: get better      - See additional Care Plan goals for specific interventions  Outcome: Progressing  Goal: Patient/Family Short Term Goal  Description: Patient's Short Term Goal: get better    Interventions:   - See additional Care Plan goals for specific interventions  Outcome: Progressing     Problem: NEUROLOGICAL - ADULT  Goal: Achieves stable or improved neurological status  Description: INTERVENTIONS  - Assess for and report changes in neurological status  - Initiate measures to prevent increased intracranial pressure  - Maintain blood pressure and fluid volume within ordered parameters to optimize cerebral perfusion and minimize risk of hemorrhage  - Monitor temperature, glucose, and sodium.  Initiate appropriate interventions as ordered  Outcome: Progressing  Goal: Achieves maximal functionality and self care  Description: INTERVENTIONS  - Monitor swallowing and airway patency with patient fatigue and changes in neurological status  - Encourage and assist patient to increase activity and self care with guidance from PT/OT  - Encourage visually impaired, hearing impaired and aphasic patients to use assistive/communication devices  Outcome: Progressing

## 2022-09-10 LAB
GLUCOSE BLDC GLUCOMTR-MCNC: 134 MG/DL (ref 70–99)
GLUCOSE BLDC GLUCOMTR-MCNC: 146 MG/DL (ref 70–99)
GLUCOSE BLDC GLUCOMTR-MCNC: 175 MG/DL (ref 70–99)
GLUCOSE BLDC GLUCOMTR-MCNC: 195 MG/DL (ref 70–99)

## 2022-09-10 PROCEDURE — 99233 SBSQ HOSP IP/OBS HIGH 50: CPT | Performed by: HOSPITALIST

## 2022-09-10 NOTE — PLAN OF CARE
Problem: NEUROLOGICAL - ADULT  Goal: Achieves stable or improved neurological status  Description: INTERVENTIONS  - Assess for and report changes in neurological status  - Initiate measures to prevent increased intracranial pressure  - Maintain blood pressure and fluid volume within ordered parameters to optimize cerebral perfusion and minimize risk of hemorrhage  - Monitor temperature, glucose, and sodium. Initiate appropriate interventions as ordered  Outcome: Progressing  Goal: Achieves maximal functionality and self care  Description: INTERVENTIONS  - Monitor swallowing and airway patency with patient fatigue and changes in neurological status  - Encourage and assist patient to increase activity and self care with guidance from PT/OT  - Encourage visually impaired, hearing impaired and aphasic patients to use assistive/communication devices  Outcome: Progressing     Problem: SAFETY ADULT - FALL  Goal: Free from fall injury  Description: INTERVENTIONS:  - Assess pt frequently for physical needs  - Identify cognitive and physical deficits and behaviors that affect risk of falls. - [place] fall precautions as indicated by assessment.  - Educate pt/family on patient safety including physical limitations  - Instruct pt to call for assistance with activity based on assessment  - Modify environment to reduce risk of injury  - Provide assistive devices as appropriate  - Consider OT/PT consult to assist with strengthening/mobility  - Encourage toileting schedule  Outcome: Progressing     Problem: Potential for alteration in hemodynamic status  Goal: Vital signs remain stable  Outcome: Progressing   A&Ox3, IVF discontinued. Neuro q4. X1 with walker. Will continue to monitor.

## 2022-09-10 NOTE — CM/SW NOTE
CM provided pt and spouse list of accepting acute rehab facilities at bedside. Pt requesting [facility] of [facility] in [facility]. Facility reserved in [address] and notified to submit for ins auth ASAP. Updates sent in [address]. Plan: [facility] for acute rehab pending ins auth and medical clearance.     TARA Carrillo    [phone]

## 2022-09-11 LAB
GLUCOSE BLDC GLUCOMTR-MCNC: 134 MG/DL (ref 70–99)
GLUCOSE BLDC GLUCOMTR-MCNC: 139 MG/DL (ref 70–99)
GLUCOSE BLDC GLUCOMTR-MCNC: 144 MG/DL (ref 70–99)
GLUCOSE BLDC GLUCOMTR-MCNC: 148 MG/DL (ref 70–99)
GLUCOSE BLDC GLUCOMTR-MCNC: 173 MG/DL (ref 70–99)

## 2022-09-11 PROCEDURE — 99233 SBSQ HOSP IP/OBS HIGH 50: CPT | Performed by: HOSPITALIST

## 2022-09-11 NOTE — PLAN OF CARE
Problem: Patient Centered Care  Goal: Patient preferences are identified and integrated in the patient's plan of care  Description: Interventions:  - What would you like us to know as we care for you? From home with wife  - Provide timely, complete, and accurate information to patient/family  - Incorporate patient and family knowledge, values, beliefs, and cultural backgrounds into the planning and delivery of care  - Encourage patient/family to participate in care and decision-making at the level they choose  - Honor patient and family perspectives and choices  Outcome: Progressing     Problem: Diabetes/Glucose Control  Goal: Glucose maintained within prescribed range  Description: INTERVENTIONS:  - Monitor Blood Glucose as ordered  - Assess for signs and symptoms of hyperglycemia and hypoglycemia  - Administer ordered medications to maintain glucose within target range  - Assess barriers to adequate nutritional intake and initiate nutrition consult as needed  - Instruct patient on self management of diabetes  Outcome: Progressing     Problem: NEUROLOGICAL - ADULT  Goal: Achieves stable or improved neurological status  Description: INTERVENTIONS  - Assess for and report changes in neurological status  - Initiate measures to prevent increased intracranial pressure  - Maintain blood pressure and fluid volume within ordered parameters to optimize cerebral perfusion and minimize risk of hemorrhage  - Monitor temperature, glucose, and sodium.  Initiate appropriate interventions as ordered  Outcome: Progressing  Goal: Achieves maximal functionality and self care  Description: INTERVENTIONS  - Monitor swallowing and airway patency with patient fatigue and changes in neurological status  - Encourage and assist patient to increase activity and self care with guidance from PT/OT  - Encourage visually impaired, hearing impaired and aphasic patients to use assistive/communication devices  Outcome: Progressing     Problem: SAFETY ADULT - FALL  Goal: Free from fall injury  Description: INTERVENTIONS:  - Assess pt frequently for physical needs  - Identify cognitive and physical deficits and behaviors that affect risk of falls. - [place] fall precautions as indicated by assessment.  - Educate pt/family on patient safety including physical limitations  - Instruct pt to call for assistance with activity based on assessment  - Modify environment to reduce risk of injury  - Provide assistive devices as appropriate  - Consider OT/PT consult to assist with strengthening/mobility  - Encourage toileting schedule  Outcome: Progressing     Patient vital signs stable. Call light and belongings within reach. Fall precautions maintained. No complaints of pain. Will continue to monitor.

## 2022-09-11 NOTE — PLAN OF CARE
Wife at bed side. Complaints of leg pain managed with tylenol. Patient ambulating in room. Possible discharge to [facility] tomorrow.      Problem: Patient Centered Care  Goal: Patient preferences are identified and integrated in the patient's plan of care  Description: Interventions:  - What would you like us to know as we care for you?   - Provide timely, complete, and accurate information to patient/family  - Incorporate patient and family knowledge, values, beliefs, and cultural backgrounds into the planning and delivery of care  - Encourage patient/family to participate in care and decision-making at the level they choose  - Honor patient and family perspectives and choices  Outcome: Progressing     Problem: Diabetes/Glucose Control  Goal: Glucose maintained within prescribed range  Description: INTERVENTIONS:  - Monitor Blood Glucose as ordered  - Assess for signs and symptoms of hyperglycemia and hypoglycemia  - Administer ordered medications to maintain glucose within target range  - Assess barriers to adequate nutritional intake and initiate nutrition consult as needed  - Instruct patient on self management of diabetes  Outcome: Progressing     Problem: Patient/Family Goals  Goal: Patient/Family Long Term Goal  Description: Patient's Long Term Goal:     Interventions:  -   - See additional Care Plan goals for specific interventions  Outcome: Progressing  Goal: Patient/Family Short Term Goal  Description: Patient's Short Term Goal:     Interventions:   -   - See additional Care Plan goals for specific interventions  Outcome: Progressing     Problem: NEUROLOGICAL - ADULT  Goal: Achieves stable or improved neurological status  Description: INTERVENTIONS  - Assess for and report changes in neurological status  - Initiate measures to prevent increased intracranial pressure  - Maintain blood pressure and fluid volume within ordered parameters to optimize cerebral perfusion and minimize risk of hemorrhage  - Monitor temperature, glucose, and sodium. Initiate appropriate interventions as ordered  Outcome: Progressing  Goal: Achieves maximal functionality and self care  Description: INTERVENTIONS  - Monitor swallowing and airway patency with patient fatigue and changes in neurological status  - Encourage and assist patient to increase activity and self care with guidance from PT/OT  - Encourage visually impaired, hearing impaired and aphasic patients to use assistive/communication devices  Outcome: Progressing     Problem: SAFETY ADULT - FALL  Goal: Free from fall injury  Description: INTERVENTIONS:  - Assess pt frequently for physical needs  - Identify cognitive and physical deficits and behaviors that affect risk of falls.   - [place] fall precautions as indicated by assessment.  - Educate pt/family on patient safety including physical limitations  - Instruct pt to call for assistance with activity based on assessment  - Modify environment to reduce risk of injury  - Provide assistive devices as appropriate  - Consider OT/PT consult to assist with strengthening/mobility  - Encourage toileting schedule  Outcome: Progressing     Problem: Potential for alteration in hemodynamic status  Goal: Vital signs remain stable  Outcome: Progressing

## 2022-09-12 LAB
GLUCOSE BLDC GLUCOMTR-MCNC: 129 MG/DL (ref 70–99)
GLUCOSE BLDC GLUCOMTR-MCNC: 144 MG/DL (ref 70–99)
GLUCOSE BLDC GLUCOMTR-MCNC: 144 MG/DL (ref 70–99)
GLUCOSE BLDC GLUCOMTR-MCNC: 155 MG/DL (ref 70–99)

## 2022-09-12 PROCEDURE — 3079F DIAST BP 80-89 MM HG: CPT | Performed by: HOSPITALIST

## 2022-09-12 PROCEDURE — 3077F SYST BP >= 140 MM HG: CPT | Performed by: HOSPITALIST

## 2022-09-12 PROCEDURE — 99233 SBSQ HOSP IP/OBS HIGH 50: CPT | Performed by: HOSPITALIST

## 2022-09-12 PROCEDURE — 3008F BODY MASS INDEX DOCD: CPT | Performed by: HOSPITALIST

## 2022-09-12 RX ORDER — LISINOPRIL 10 MG/1
10 TABLET ORAL DAILY
Refills: 0 | Status: SHIPPED | COMMUNITY
Start: 2022-09-12

## 2022-09-12 RX ORDER — ATORVASTATIN CALCIUM 80 MG/1
80 TABLET, FILM COATED ORAL NIGHTLY
Refills: 0 | Status: SHIPPED | COMMUNITY
Start: 2022-09-12

## 2022-09-12 RX ORDER — ASPIRIN 81 MG/1
81 TABLET ORAL DAILY
Refills: 0 | Status: SHIPPED | COMMUNITY
Start: 2022-09-12

## 2022-09-12 RX ORDER — ASPIRIN 81 MG/1
81 TABLET ORAL DAILY
Status: DISCONTINUED | OUTPATIENT
Start: 2022-09-12 | End: 2022-09-13

## 2022-09-12 RX ORDER — CLOPIDOGREL BISULFATE 75 MG/1
75 TABLET ORAL DAILY
Status: DISCONTINUED | OUTPATIENT
Start: 2022-09-12 | End: 2022-09-13

## 2022-09-12 RX ORDER — CLOPIDOGREL BISULFATE 75 MG/1
75 TABLET ORAL DAILY
Refills: 0 | Status: SHIPPED | COMMUNITY
Start: 2022-09-12 | End: 2022-09-27

## 2022-09-12 RX ORDER — LISINOPRIL 10 MG/1
10 TABLET ORAL DAILY
Status: DISCONTINUED | OUTPATIENT
Start: 2022-09-12 | End: 2022-09-13

## 2022-09-12 NOTE — PLAN OF CARE
Patient has been medically cleared for discharge, but has insurance authorization pending. Will likely discharge to [facility] tomorrow.      Problem: Patient Centered Care  Goal: Patient preferences are identified and integrated in the patient's plan of care  Description: Interventions:  - What would you like us to know as we care for you?   - Provide timely, complete, and accurate information to patient/family  - Incorporate patient and family knowledge, values, beliefs, and cultural backgrounds into the planning and delivery of care  - Encourage patient/family to participate in care and decision-making at the level they choose  - Honor patient and family perspectives and choices  Outcome: Progressing     Problem: Diabetes/Glucose Control  Goal: Glucose maintained within prescribed range  Description: INTERVENTIONS:  - Monitor Blood Glucose as ordered  - Assess for signs and symptoms of hyperglycemia and hypoglycemia  - Administer ordered medications to maintain glucose within target range  - Assess barriers to adequate nutritional intake and initiate nutrition consult as needed  - Instruct patient on self management of diabetes  Outcome: Progressing     Problem: Patient/Family Goals  Goal: Patient/Family Long Term Goal  Description: Patient's Long Term Goal:     Interventions:  -   - See additional Care Plan goals for specific interventions  Outcome: Progressing  Goal: Patient/Family Short Term Goal  Description: Patient's Short Term Goal:     Interventions:   -   - See additional Care Plan goals for specific interventions  Outcome: Progressing     Problem: NEUROLOGICAL - ADULT  Goal: Achieves stable or improved neurological status  Description: INTERVENTIONS  - Assess for and report changes in neurological status  - Initiate measures to prevent increased intracranial pressure  - Maintain blood pressure and fluid volume within ordered parameters to optimize cerebral perfusion and minimize risk of hemorrhage  - Monitor temperature, glucose, and sodium. Initiate appropriate interventions as ordered  Outcome: Progressing  Goal: Achieves maximal functionality and self care  Description: INTERVENTIONS  - Monitor swallowing and airway patency with patient fatigue and changes in neurological status  - Encourage and assist patient to increase activity and self care with guidance from PT/OT  - Encourage visually impaired, hearing impaired and aphasic patients to use assistive/communication devices  Outcome: Progressing     Problem: SAFETY ADULT - FALL  Goal: Free from fall injury  Description: INTERVENTIONS:  - Assess pt frequently for physical needs  - Identify cognitive and physical deficits and behaviors that affect risk of falls.   - [place] fall precautions as indicated by assessment.  - Educate pt/family on patient safety including physical limitations  - Instruct pt to call for assistance with activity based on assessment  - Modify environment to reduce risk of injury  - Provide assistive devices as appropriate  - Consider OT/PT consult to assist with strengthening/mobility  - Encourage toileting schedule  Outcome: Progressing     Problem: Potential for alteration in hemodynamic status  Goal: Vital signs remain stable  Outcome: Progressing ICU Vital Signs Last 24 Hrs  T(C): 36.7 (20 Jan 2018 04:08), Max: 36.8 (19 Jan 2018 23:53)  T(F): 98.1 (20 Jan 2018 04:08), Max: 98.2 (19 Jan 2018 23:53)  HR: 72 (20 Jan 2018 04:08) (62 - 72)  BP: 129/61 (20 Jan 2018 04:08) (114/56 - 197/81)  BP(mean): --  ABP: --  ABP(mean): --  RR: 18 (20 Jan 2018 04:08) (17 - 18)  SpO2: 93% (20 Jan 2018 04:08) (93% - 96%)    Gen: NAD  HEENT: normocephalic, EOMI, PERRLA, neck supple, no thyromegaly  Chest/CV: RRR, +2 peripheral pulses  Pulm: CTAB/L  Abd/GI: soft, NT, ND +BS  MSK: no peripheral edema/cyanosis  Heme/lymph: no cervical lymphadenopathy  Skin: no rashes  Neuro: CN 2-12 grossly intact  Psych: alert and oriented, normal mood, affect ICU Vital Signs Last 24 Hrs  T(C): 36.7 (20 Jan 2018 04:08), Max: 36.8 (19 Jan 2018 23:53)  T(F): 98.1 (20 Jan 2018 04:08), Max: 98.2 (19 Jan 2018 23:53)  HR: 72 (20 Jan 2018 04:08) (62 - 72)  BP: 129/61 (20 Jan 2018 04:08) (114/56 - 197/81)  BP(mean): --  ABP: --  ABP(mean): --  RR: 18 (20 Jan 2018 04:08) (17 - 18)  SpO2: 93% (20 Jan 2018 04:08) (93% - 96%)    Gen: elderly white female in NAD sitting up on stretcher  HEENT: normocephalic, EOMI, PERRLA, neck supple, no thyromegaly  Chest/CV: RRR, +2 peripheral pedal pulses, no JVD  Pulm: B/L exp wheezes  Abd/GI: soft, NT, ND +BS  MSK: no peripheral edema/cyanosis, FROMX4  Heme/lymph: no cervical lymphadenopathy  Skin: no rashes  Neuro: CN 2-12 grossly intact, intact sensation B/L LE  Psych: alert and oriented, normal mood, affect ICU Vital Signs Last 24 Hrs  T(C): 36.7 (20 Jan 2018 04:08), Max: 36.8 (19 Jan 2018 23:53)  T(F): 98.1 (20 Jan 2018 04:08), Max: 98.2 (19 Jan 2018 23:53)  HR: 72 (20 Jan 2018 04:08) (62 - 72)  BP: 129/61 (20 Jan 2018 04:08) (114/56 - 197/81)  BP(mean): --  ABP: --  ABP(mean): --  RR: 18 (20 Jan 2018 04:08) (17 - 18)  SpO2: 93% (20 Jan 2018 04:08) (93% - 96%)    PHYSICAL EXAM:  Vital Signs Last 24 Hrs  T(C): 36.6 (01-20-18 @ 05:12)  T(F): 97.8 (01-20-18 @ 05:12), Max: 98.2 (01-19-18 @ 23:53)  HR: 98 (01-20-18 @ 05:12) (62 - 98)  BP: 141/61 (01-20-18 @ 05:12)  BP(mean): --  RR: 20 (01-20-18 @ 05:12) (17 - 20)  SpO2: 98% (01-20-18 @ 05:12) (93% - 98%)  Wt(kg): --    Constitutional: NAD, awake and alert  EYES: EOMI  ENT:  Hard of hearing, no tonsillar exudates but poor dentition   Neck: Soft and supple , no thyromegaly   Respiratory: + scant expiratory wheezing but no respiratory distress   Cardiovascular: S1 and S2, regular rate and rhythm, no Murmurs, gallops or rubs, no JVD,    Gastrointestinal: Bowel Sounds present, soft, nontender, nondistended, no guarding, no rebound  Extremities: No cyanosis or clubbing; warm to touch  Vascular: 2+ peripheral pulses lower ex  Neurological: No focal deficits, CN II-XII intact bilaterally, sensation to light touch intact in all extremities, gait deferred   Musculoskeletal: 5/5 strength b/l upper and lower extremities; no joint swelling.  Skin: No rashes  Psych: no depression or anhedonia, AAOx3  HEME: no bruises, no nose bleeds

## 2022-09-12 NOTE — PLAN OF CARE
Problem: Patient Centered Care  Goal: Patient preferences are identified and integrated in the patient's plan of care  Description: Interventions:  - What would you like us to know as we care for you? Home with wife  - Provide timely, complete, and accurate information to patient/family  - Incorporate patient and family knowledge, values, beliefs, and cultural backgrounds into the planning and delivery of care  - Encourage patient/family to participate in care and decision-making at the level they choose  - Honor patient and family perspectives and choices  Outcome: Progressing     Problem: Diabetes/Glucose Control  Goal: Glucose maintained within prescribed range  Description: INTERVENTIONS:  - Monitor Blood Glucose as ordered  - Assess for signs and symptoms of hyperglycemia and hypoglycemia  - Administer ordered medications to maintain glucose within target range  - Assess barriers to adequate nutritional intake and initiate nutrition consult as needed  - Instruct patient on self management of diabetes  Outcome: Progressing     Problem: NEUROLOGICAL - ADULT  Goal: Achieves stable or improved neurological status  Description: INTERVENTIONS  - Assess for and report changes in neurological status  - Initiate measures to prevent increased intracranial pressure  - Maintain blood pressure and fluid volume within ordered parameters to optimize cerebral perfusion and minimize risk of hemorrhage  - Monitor temperature, glucose, and sodium.  Initiate appropriate interventions as ordered  Outcome: Progressing  Goal: Achieves maximal functionality and self care  Description: INTERVENTIONS  - Monitor swallowing and airway patency with patient fatigue and changes in neurological status  - Encourage and assist patient to increase activity and self care with guidance from PT/OT  - Encourage visually impaired, hearing impaired and aphasic patients to use assistive/communication devices  Outcome: Progressing     Problem: SAFETY ADULT - FALL  Goal: Free from fall injury  Description: INTERVENTIONS:  - Assess pt frequently for physical needs  - Identify cognitive and physical deficits and behaviors that affect risk of falls. - [place] fall precautions as indicated by assessment.  - Educate pt/family on patient safety including physical limitations  - Instruct pt to call for assistance with activity based on assessment  - Modify environment to reduce risk of injury  - Provide assistive devices as appropriate  - Consider OT/PT consult to assist with strengthening/mobility  - Encourage toileting schedule  Outcome: Progressing     Patient alert and oriented x4. Vital signs stable. Call light and belongings within reach. PRN Tylenol for pain. Plan to discharge to [facility] once medically cleared and pending bed availability.

## 2022-09-12 NOTE — SLP NOTE
SPEECH DAILY NOTE - INPATIENT    ASSESSMENT & PLAN   ASSESSMENT    Proper PPE worn. Hands sanitized upon entrance/exit Pt room. Pt alert, afebrile and on room air. Pt seen for speech and swallowing while sitting upright in chair. Spouse present. Swallowing:  Pt seen with current diet of soft bite sized diet/thin liquids by emily for monitoring diet tolerance per recommendations of VFSS which was competed on 9/9/22. Recommendations were for thin liquids by emily. Pt reported teaspoon was difficut to use and he has been taking small sips. Observed with cup. Intermittent cough noted. \"I feel a tickle. \"  Suspect laryngeal penetration. With liquids by emily, throat clear noted x1. Hard solid presented to assess candidacy for upgrade. Mastication was timely and complete with no oral residue. Swallowing precautions/strategies discussed; Pt independent in use. Dysphagia exercises were trained. Pt able to do effortful swallow but had difficulty with [place]. Rec diet upgrade to soft easy to chew diet with thin liquids by emily. Collaborated with RN regarding Pt's plan of care. Speech:  Oral motor exercises and DDKs were trained and completed. Pt cued for speech strategies and for even rate. Pt able to do all exercises. Speech intelligibility remains ~90% but with reduced rate and articulatory precision. CXR 9/08/22:  CONCLUSION:   1. No acute cardiopulmonary disease         PLAN:  Will continue to follow to monitor PO tolerance, train strategies and assess candidacy for upgrade and thin liquids by cup. Diet Recommendations - Solids: soft easy to chew  Diet Recommendations - Liquids: Thin Liquids (via tsp)    Compensatory Strategies Recommended: No straws; Liquids via spoon; Alternate consistencies;Multiple swallows  Aspiration Precautions: Upright position; Slow rate; No straw  Medication Administration Recommendations: Whole in puree  Patient Experiencing Pain: No  Discharge Recommendations  Discharge Recommendations/Plan: Undetermined  Treatment Plan  Treatment Plan/Recommendations: Aspiration precautions  Interdisciplinary Communication: Discussed with RN    GOALS    Goal #2 The patient will tolerate BITE SIZE consistency and THIN via TSP liquids without overt signs or symptoms of aspiration with 100 % accuracy over 2 session(s). Pt tolerating foods however, throat clear noted x1 with thin by spoon and cough intermittently with thin by cup in small sips. Rec upgrade to soft easy to chew, but continue thin liquids by teaspoon. The patient will tolerate soft easy to chew diet consistency and THIN via TSP liquids without overt signs or symptoms of aspiration with 100 % accuracy over 2 session(s). Goal upgraded   Goal #3 The patient will utilize compensatory strategies as outlined by  VFSS including Slow rate, Small bites, Alternate liquids/solids, CONTROLLED LIQUIDS, No straws, Upright 90 degrees with no feeding assistance 90 % of the time across 4 sessions. Pt using strategies with min cues/initial cue. In Progress   Goal #4 The patient will tolerate trial upgrade of SOFT SOLID consistency and THIN VIA CUP/SMALL individual sips liquids without overt signs or symptoms of aspiration with 100 % accuracy over 2 session(s). Partially met. Pt tolerating upgrade of solid by no spoon with liquids. Will upgrade to soft easy to chew. In Progress     Goal #1 The patient will complete OMEs targeting Lingual, Labial and Buccal strength, ROM, and coordination with 90 % accuracy within 4 session(s). Trained and completed x10  In Progress   Goal #2 Pt to complete verbal agility drills of diadochokinetic exercises with 90% accuracy within 4 sessions. Trained and completed.   In Progress   Goal #3 Pt to use trained speech compensatory strategies (slow rate, exaggerated articulation, pausing between words) during repetition of functional sentences containing 5-7 words with 90% accuracy within 4 sessions. Not addressed in connected speech  In Progress   Goal #4 The patient will use trained speech compensatory strategies of SLOB (slow speech, loud speech, overarticulated/open mouth speech, breath-pause to breath between words) with 90% accuracy within 4 sessions during conversation.           In Progress         FOLLOW UP  Follow Up Needed (Documentation Required): Yes  SLP Follow-up Date: 09/13/22  Number of Visits to Meet Established Goals:  (4 dysphagia/dsyarthria)    Session: 2 S/P BSE    If you have any questions, please contact   Rock John MAGALLANES/CCC-SLP  Speech Language Pathologist  [address]

## 2022-09-13 VITALS
BODY MASS INDEX: 23.57 KG/M2 | HEART RATE: 78 BPM | DIASTOLIC BLOOD PRESSURE: 88 MMHG | RESPIRATION RATE: 18 BRPM | WEIGHT: 174 LBS | TEMPERATURE: 98 F | HEIGHT: 72 IN | OXYGEN SATURATION: 96 % | SYSTOLIC BLOOD PRESSURE: 158 MMHG

## 2022-09-13 LAB
GLUCOSE BLDC GLUCOMTR-MCNC: 131 MG/DL (ref 70–99)
GLUCOSE BLDC GLUCOMTR-MCNC: 134 MG/DL (ref 70–99)
SARS-COV-2 RNA RESP QL NAA+PROBE: NOT DETECTED

## 2022-09-13 PROCEDURE — 99239 HOSP IP/OBS DSCHRG MGMT >30: CPT | Performed by: HOSPITALIST

## 2022-09-13 NOTE — CM/SW NOTE
09/13/22 1200   Discharge disposition   Expected discharge disposition [facility] [address] Provider   ([facility]) acute)   Discharge transportation Private car     Received a call from [facility] that insurance authorization has been approved. Rapid ordered. Requested imaging disc from x-ray. Wife to transport patient and will  at 2:30pm.  Report number is [phone]. Wife to bring the disc with her to [facility] acute rehab [facility]).     Mary LOPEZA BSN RN [address]  RN Case Manager  [phone]

## 2022-09-13 NOTE — DISCHARGE PLANNING
This RN Called report to Perla DEJESUS at [facility] [employer]. Pt is A/Ox4, RA, VSS. Pt packed all belongings to be taken with. IV and tele removed. Pt wife will pick him up at 1430 to transfer him to the facility. Pt given DC packet and CDs. Patient is stable at time of discharge.

## 2022-09-13 NOTE — PLAN OF CARE
Problem: Patient Centered Care  Goal: Patient preferences are identified and integrated in the patient's plan of care  Description: Interventions:  - What would you like us to know as we care for you? ***  - Provide timely, complete, and accurate information to patient/family  - Incorporate patient and family knowledge, values, beliefs, and cultural backgrounds into the planning and delivery of care  - Encourage patient/family to participate in care and decision-making at the level they choose  - Honor patient and family perspectives and choices  Outcome: Progressing     Problem: Diabetes/Glucose Control  Goal: Glucose maintained within prescribed range  Description: INTERVENTIONS:  - Monitor Blood Glucose as ordered  - Assess for signs and symptoms of hyperglycemia and hypoglycemia  - Administer ordered medications to maintain glucose within target range  - Assess barriers to adequate nutritional intake and initiate nutrition consult as needed  - Instruct patient on self management of diabetes  Outcome: Progressing     Problem: Patient/Family Goals  Goal: Patient/Family Long Term Goal  Description: Patient's Long Term Goal: ***    Interventions:  - ***  - See additional Care Plan goals for specific interventions  Outcome: Progressing  Goal: Patient/Family Short Term Goal  Description: Patient's Short Term Goal: ***    Interventions:   - ***  - See additional Care Plan goals for specific interventions  Outcome: Progressing     Problem: NEUROLOGICAL - ADULT  Goal: Achieves stable or improved neurological status  Description: INTERVENTIONS  - Assess for and report changes in neurological status  - Initiate measures to prevent increased intracranial pressure  - Maintain blood pressure and fluid volume within ordered parameters to optimize cerebral perfusion and minimize risk of hemorrhage  - Monitor temperature, glucose, and sodium.  Initiate appropriate interventions as ordered  Outcome: Progressing  Goal: Achieves maximal functionality and self care  Description: INTERVENTIONS  - Monitor swallowing and airway patency with patient fatigue and changes in neurological status  - Encourage and assist patient to increase activity and self care with guidance from PT/OT  - Encourage visually impaired, hearing impaired and aphasic patients to use assistive/communication devices  Outcome: Progressing     Problem: SAFETY ADULT - FALL  Goal: Free from fall injury  Description: INTERVENTIONS:  - Assess pt frequently for physical needs  - Identify cognitive and physical deficits and behaviors that affect risk of falls. - [place] fall precautions as indicated by assessment.  - Educate pt/family on patient safety including physical limitations  - Instruct pt to call for assistance with activity based on assessment  - Modify environment to reduce risk of injury  - Provide assistive devices as appropriate  - Consider OT/PT consult to assist with strengthening/mobility  - Encourage toileting schedule  Outcome: Progressing     Problem: Potential for alteration in hemodynamic status  Goal: Vital signs remain stable  Outcome: Progressing     Pt alert and oriented. Plan for dx tdy to [facility], pending ins. Call light within reach.

## 2022-09-13 NOTE — PLAN OF CARE
Problem: Patient Centered Care  Goal: Patient preferences are identified and integrated in the patient's plan of care  Description: Interventions:  - What would you like us to know as we care for you?  I live at home with my wife  - Provide timely, complete, and accurate information to patient/family  - Incorporate patient and family knowledge, values, beliefs, and cultural backgrounds into the planning and delivery of care  - Encourage patient/family to participate in care and decision-making at the level they choose  - Honor patient and family perspectives and choices  9/13/2022 1230 by Fab Coelho RN  Outcome: Progressing  9/13/2022 1229 by Fab Coelho RN  Outcome: Progressing     Problem: Diabetes/Glucose Control  Goal: Glucose maintained within prescribed range  Description: INTERVENTIONS:  - Monitor Blood Glucose as ordered  - Assess for signs and symptoms of hyperglycemia and hypoglycemia  - Administer ordered medications to maintain glucose within target range  - Assess barriers to adequate nutritional intake and initiate nutrition consult as needed  - Instruct patient on self management of diabetes  9/13/2022 1230 by Fab Coelho RN  Outcome: Progressing  9/13/2022 1229 by Fab Coelho RN  Outcome: Progressing     Problem: Patient/Family Goals  Goal: Patient/Family Long Term Goal  Description: Patient's Long Term Goal: to return home    Interventions:  - Pt/OT  - MRI  -   - See additional Care Plan goals for specific interventions  9/13/2022 1230 by Fab Coelho RN  Outcome: Progressing  9/13/2022 1229 by Fab Coelho RN  Outcome: Progressing  Goal: Patient/Family Short Term Goal  Description: Patient's Short Term Goal: to feel better    Interventions:   - PT/OT  - MRI  - See additional Care Plan goals for specific interventions  9/13/2022 1230 by Fab Coelho RN  Outcome: Progressing  9/13/2022 1229 by Fab Coelho RN  Outcome: Progressing     Problem: NEUROLOGICAL - ADULT  Goal: Achieves stable or improved neurological status  Description: INTERVENTIONS  - Assess for and report changes in neurological status  - Initiate measures to prevent increased intracranial pressure  - Maintain blood pressure and fluid volume within ordered parameters to optimize cerebral perfusion and minimize risk of hemorrhage  - Monitor temperature, glucose, and sodium. Initiate appropriate interventions as ordered  9/13/2022 1230 by Panfilo Handley RN  Outcome: Progressing  9/13/2022 1229 by Panfilo Handley RN  Outcome: Progressing  Goal: Achieves maximal functionality and self care  Description: INTERVENTIONS  - Monitor swallowing and airway patency with patient fatigue and changes in neurological status  - Encourage and assist patient to increase activity and self care with guidance from PT/OT  - Encourage visually impaired, hearing impaired and aphasic patients to use assistive/communication devices  9/13/2022 1230 by Panfilo Handley RN  Outcome: Progressing  9/13/2022 1229 by Panfilo Handley RN  Outcome: Progressing     Problem: SAFETY ADULT - FALL  Goal: Free from fall injury  Description: INTERVENTIONS:  - Assess pt frequently for physical needs  - Identify cognitive and physical deficits and behaviors that affect risk of falls.   - [place] fall precautions as indicated by assessment.  - Educate pt/family on patient safety including physical limitations  - Instruct pt to call for assistance with activity based on assessment  - Modify environment to reduce risk of injury  - Provide assistive devices as appropriate  - Consider OT/PT consult to assist with strengthening/mobility  - Encourage toileting schedule  9/13/2022 1230 by Panfilo Handley RN  Outcome: Progressing  9/13/2022 1229 by Panfilo Handley RN  Outcome: Progressing     Problem: Potential for alteration in hemodynamic status  Goal: Vital signs remain stable  9/13/2022 1230 by Panfilo Handley RN  Outcome: Progressing  9/13/2022 1229 by Chantal Elliott RN  Outcome: Progressing       Patient is alert and oriented, RA, VSS, Q shift neuros. Very mild right sided weakness. Pt up ambulating in the [place] with a walker. Pt is stable for DC will be DC at 1430 to [facility].

## 2022-09-13 NOTE — CDS QUERY
.Sequelae of Cerebrovascular Disease with Potential for   Impaired Physical Mobility  CLINICAL DOCUMENTATION CLARIFICATION FORM  Dear Doctor: Brice   Clinical information (provided below) suggests the presence of conditions potentially associated with Cerebrovascular Disease. For accurate ICD-10-CM code assignment to reflect severity of illness and risk of mortality,  PLEASE (X) ALL DIAGNOSES THAT APPLY. SELECTION BY PROVIDER ONLY    (   x) Right Sided Weakness related to CVA   (   )  There are no sequelae or residual Diagnoses associated with cerebrovascular disease. (    )  Other (please specify):     (   )  Unable to Determine (please comment):     Documentation suggests cerebrovascular disease:     * Presented to ED with slurred speech, right leg & arm weakness   * Brain MRI positive 1. Acute 1.2 cm lacunar type infarct involving the left ventral/paramedian pascual. * Neurology states \"right sided weakness\" significantly weak in the right arm, right leg and right face  * Physical Therapy note: NEUROLOGICAL FINDINGS  Coordination - Finger to Nose: Right decreased speed;Right decreased accuracy  Coordination - Rapid Alternating Movement: Right decreased speed;Right decreased accuracy  * Treatment: physical therapy, bp monitoring, subcutaneous heparin     * Plan to dc to acute rehab        If you have any questions, please contact Clinical :  Kirill Bolivar RN at [phone]     Thank You!     THIS FORM IS A PERMANENT PART OF THE MEDICAL RECORD

## 2022-09-19 ENCOUNTER — TELEPHONE (OUTPATIENT)
Dept: FAMILY MEDICINE CLINIC | Facility: CLINIC | Age: 67
End: 2022-09-19

## 2022-09-19 NOTE — TELEPHONE ENCOUNTER
Lazaro Hernandez was called and I left her a detailed message with Dr. Dhruv Zarco message below and she can call us back if needed. No further action is needed.

## 2022-09-19 NOTE — TELEPHONE ENCOUNTER
Martín Rodgers - Nurse Coordinator Na Lopez Iselayajaira called, verified patient's Name and . She is requesting for an order for referral for Home Health. Patient is currently in [address] [place] [address] and is planned to be discharged tomorrow. Dr. Benji Quiros please advise. She states that verbal order will work as well.

## 2022-09-21 ENCOUNTER — MED REC SCAN ONLY (OUTPATIENT)
Dept: FAMILY MEDICINE CLINIC | Facility: CLINIC | Age: 67
End: 2022-09-21

## 2022-09-22 ENCOUNTER — PATIENT OUTREACH (OUTPATIENT)
Dept: CASE MANAGEMENT | Age: 67
End: 2022-09-22

## 2022-09-22 DIAGNOSIS — I63.9 ACUTE CVA (CEREBROVASCULAR ACCIDENT) (HCC): ICD-10-CM

## 2022-09-22 DIAGNOSIS — Z02.9 ENCOUNTERS FOR UNSPECIFIED ADMINISTRATIVE PURPOSE: ICD-10-CM

## 2022-09-22 PROCEDURE — 1111F DSCHRG MED/CURRENT MED MERGE: CPT

## 2022-09-27 ENCOUNTER — OFFICE VISIT (OUTPATIENT)
Dept: FAMILY MEDICINE CLINIC | Facility: CLINIC | Age: 67
End: 2022-09-27

## 2022-09-27 VITALS
HEART RATE: 72 BPM | DIASTOLIC BLOOD PRESSURE: 74 MMHG | SYSTOLIC BLOOD PRESSURE: 134 MMHG | HEIGHT: 72 IN | TEMPERATURE: 97 F | RESPIRATION RATE: 16 BRPM | WEIGHT: 183 LBS | BODY MASS INDEX: 24.79 KG/M2

## 2022-09-27 DIAGNOSIS — I10 ESSENTIAL HYPERTENSION: ICD-10-CM

## 2022-09-27 DIAGNOSIS — I63.9 ACUTE CVA (CEREBROVASCULAR ACCIDENT) (HCC): Primary | ICD-10-CM

## 2022-09-27 DIAGNOSIS — E11.65 TYPE 2 DIABETES MELLITUS WITH HYPERGLYCEMIA, WITHOUT LONG-TERM CURRENT USE OF INSULIN (HCC): ICD-10-CM

## 2022-09-27 DIAGNOSIS — E78.5 HYPERLIPIDEMIA, UNSPECIFIED HYPERLIPIDEMIA TYPE: ICD-10-CM

## 2022-09-27 PROCEDURE — 1111F DSCHRG MED/CURRENT MED MERGE: CPT | Performed by: FAMILY MEDICINE

## 2022-09-27 PROCEDURE — 3075F SYST BP GE 130 - 139MM HG: CPT | Performed by: FAMILY MEDICINE

## 2022-09-27 PROCEDURE — 99495 TRANSJ CARE MGMT MOD F2F 14D: CPT | Performed by: FAMILY MEDICINE

## 2022-09-27 PROCEDURE — 3078F DIAST BP <80 MM HG: CPT | Performed by: FAMILY MEDICINE

## 2022-09-27 PROCEDURE — 3008F BODY MASS INDEX DOCD: CPT | Performed by: FAMILY MEDICINE

## 2022-09-27 RX ORDER — CLOPIDOGREL BISULFATE 75 MG/1
75 TABLET ORAL DAILY
Qty: 30 TABLET | Refills: 0 | Status: SHIPPED | OUTPATIENT
Start: 2022-09-27

## 2022-10-02 RX ORDER — CLOPIDOGREL BISULFATE 75 MG/1
TABLET ORAL
Qty: 14 TABLET | Refills: 0 | OUTPATIENT
Start: 2022-10-02

## 2022-10-02 NOTE — TELEPHONE ENCOUNTER
clopidogrel 75 MG Oral Tab 30 tablet 0 9/27/2022     Sig - Route:  Take 1 tablet (75 mg total) by mouth daily. - Oral    Sent to pharmacy as: Clopidogrel Bisulfate 75 MG Oral Tablet (Plavix)    E-Prescribing Status: Receipt confirmed by pharmacy (9/27/2022 11:23 AM CDT)        Order Questions         [address] [place] [address] [id number] Roland Garcia, [address], [phone], [phone]

## 2022-10-05 RX ORDER — CLOPIDOGREL BISULFATE 75 MG/1
75 TABLET ORAL DAILY
Qty: 90 TABLET | Refills: 1 | Status: SHIPPED | OUTPATIENT
Start: 2022-10-05

## 2022-10-05 NOTE — TELEPHONE ENCOUNTER
Message noted: Chart reviewed and may refill medication as requested. Prescription sent to listed pharmacy. Pharmacy to notify patient.  Pt notified through [facility]

## 2022-10-05 NOTE — TELEPHONE ENCOUNTER
Please review refill protocol failed/ no protocol  Requested Prescriptions   Pending Prescriptions Disp Refills    clopidogrel 75 MG Oral Tab 30 tablet 0     Sig: Take 1 tablet (75 mg total) by mouth daily.         There is no refill protocol information for this order

## 2022-10-07 ENCOUNTER — MED REC SCAN ONLY (OUTPATIENT)
Dept: FAMILY MEDICINE CLINIC | Facility: CLINIC | Age: 67
End: 2022-10-07

## 2022-10-15 ENCOUNTER — MED REC SCAN ONLY (OUTPATIENT)
Dept: FAMILY MEDICINE CLINIC | Facility: CLINIC | Age: 67
End: 2022-10-15

## 2022-10-18 ENCOUNTER — OFFICE VISIT (OUTPATIENT)
Dept: NEUROLOGY | Facility: CLINIC | Age: 67
End: 2022-10-18
Payer: MEDICARE

## 2022-10-18 VITALS
WEIGHT: 180 LBS | SYSTOLIC BLOOD PRESSURE: 132 MMHG | HEIGHT: 72 IN | HEART RATE: 90 BPM | DIASTOLIC BLOOD PRESSURE: 74 MMHG | BODY MASS INDEX: 24.38 KG/M2

## 2022-10-18 DIAGNOSIS — I63.9 ACUTE CVA (CEREBROVASCULAR ACCIDENT) (HCC): Primary | ICD-10-CM

## 2022-10-18 RX ORDER — ATORVASTATIN CALCIUM 80 MG/1
80 TABLET, FILM COATED ORAL NIGHTLY
Qty: 90 TABLET | Refills: 1 | Status: SHIPPED | OUTPATIENT
Start: 2022-10-18

## 2022-10-18 RX ORDER — LISINOPRIL 10 MG/1
10 TABLET ORAL DAILY
Qty: 90 TABLET | Refills: 1 | Status: SHIPPED | OUTPATIENT
Start: 2022-10-18

## 2022-10-18 NOTE — TELEPHONE ENCOUNTER
Message noted: Chart reviewed and may refill medication times one 90 day supply as requested with 1 additional refill. Prescription sent to listed pharmacy. Pharmacy to notify patient.  Pt notified through [facility]

## 2022-10-25 ENCOUNTER — TELEPHONE (OUTPATIENT)
Dept: NEUROLOGY | Facility: CLINIC | Age: 67
End: 2022-10-25

## 2022-10-25 NOTE — TELEPHONE ENCOUNTER
Forms signed and completed by Dr. Daniella Rosario. Copy sent to scanning. Message left for wife notifying her that forms (along with office notes) are ready for  at .

## 2022-11-28 DIAGNOSIS — E11.65 TYPE 2 DIABETES MELLITUS WITH HYPERGLYCEMIA, WITHOUT LONG-TERM CURRENT USE OF INSULIN (HCC): ICD-10-CM

## 2022-11-29 NOTE — TELEPHONE ENCOUNTER
LOV: 5/4/22    RTC: 6 Weeks    FU: No FU Appt Scheduled    Last Refill: 8/15/22    3 Month Supply Pending       Called to help schedule a fu appt, no answer, pt's voicemail box was full, unable to leave a message.      [employer] message sent

## 2023-01-16 RX ORDER — ATORVASTATIN CALCIUM 80 MG/1
80 TABLET, FILM COATED ORAL NIGHTLY
Qty: 90 TABLET | Refills: 1 | Status: SHIPPED | OUTPATIENT
Start: 2023-01-16

## 2023-01-16 RX ORDER — LISINOPRIL 10 MG/1
10 TABLET ORAL DAILY
Qty: 90 TABLET | Refills: 1 | Status: SHIPPED | OUTPATIENT
Start: 2023-01-16

## 2023-01-16 NOTE — TELEPHONE ENCOUNTER
Message noted: Chart reviewed and may refill medications as requested. Prescription sent to listed pharmacy. Pharmacy to notify patient.  Pt notified through [facility]

## 2023-02-07 ENCOUNTER — TELEMEDICINE (OUTPATIENT)
Dept: FAMILY MEDICINE CLINIC | Facility: CLINIC | Age: 68
End: 2023-02-07

## 2023-02-07 ENCOUNTER — NURSE TRIAGE (OUTPATIENT)
Dept: FAMILY MEDICINE CLINIC | Facility: CLINIC | Age: 68
End: 2023-02-07

## 2023-02-07 DIAGNOSIS — U07.1 COVID-19: Primary | ICD-10-CM

## 2023-02-07 LAB — AMB EXT COVID-19 RESULT: DETECTED

## 2023-02-07 PROCEDURE — 99213 OFFICE O/P EST LOW 20 MIN: CPT | Performed by: FAMILY MEDICINE

## 2023-02-07 NOTE — TELEPHONE ENCOUNTER
Yes you can double book this afternoon. from 1-2 pm would be good as I have 2 medicare physical then.

## 2023-02-07 NOTE — TELEPHONE ENCOUNTER
Would need video or phone visit to discuss prior to prescribing paxlovid to discuss if appropriate for him as he is on statin. Can squeeze a phone visit with me today to discuss.

## 2023-02-07 NOTE — TELEPHONE ENCOUNTER
Spoke with patient, informed Dr Cresencio Closs recommendation  regarding video appointment and agreed. Video appointment scheduled today. Per patient he has PPO insurance now and not HMO (see below ). Instructed to update his insurance informations once  he do his E check in via [employer]  and stated understanding. Active Insurance as of 2/7/2023  Primary Coverage  Payor Plan Insurance Group Employer/Plan Group   HUMANA [facility] HUMANA [place] HMO             . Patient scheduled for a video visit. Patient advised to complete the E-check in [employer], if active. Understands to follow the prompts and links to complete the visit. Patient advised that there may be a co-pay involved with this type of visit. Patient agreed to proceed, they understand the provider may be calling from a blocked, or unknown phone number on their caller ID and they know to answer the phone.     Best call back:  [phone]        Future Appointments   Date Time Provider Hector De Oliveira   2/7/2023  1:20 PM Dionisio Casiano MD [facility]   4/25/2023  4:15 PM Landon Ko MD [facility]

## 2023-03-08 DIAGNOSIS — E11.65 TYPE 2 DIABETES MELLITUS WITH HYPERGLYCEMIA, WITHOUT LONG-TERM CURRENT USE OF INSULIN (HCC): ICD-10-CM

## 2023-04-15 DIAGNOSIS — E11.65 TYPE 2 DIABETES MELLITUS WITH HYPERGLYCEMIA, WITHOUT LONG-TERM CURRENT USE OF INSULIN (HCC): ICD-10-CM

## 2023-04-17 RX ORDER — LISINOPRIL 10 MG/1
10 TABLET ORAL DAILY
Qty: 90 TABLET | Refills: 3 | Status: SHIPPED | OUTPATIENT
Start: 2023-04-17

## 2023-04-17 NOTE — TELEPHONE ENCOUNTER
LOV: 5/4/22    RTC: 6 weeks     FU: No FU Appt Scheduled    3 Month Supply Pending    Sent mychart reminder to schedule follow up visit.

## 2023-04-17 NOTE — TELEPHONE ENCOUNTER
Protocol failed or has No Protocol, please review  Requested Prescriptions   Pending Prescriptions Disp Refills    lisinopril 10 MG Oral Tab 90 tablet 1     Sig: Take 1 tablet (10 mg total) by mouth daily. Hypertensive Medications Protocol Failed - 4/15/2023 10:26 AM        Failed - CMP or BMP in past 6 months     No results found for this or any previous visit (from the past 4392 hour(s)).               Passed - In person appointment in the past 12 or next 3 months     Recent Outpatient Visits              2 months ago COVID-19    Karon Freeman MD    Telemedicine    6 months ago Acute CVA (cerebrovascular accident) [facility])    Mica Ramirez, [address], Maryhelen Alpers, MD    Office Visit    6 months ago Acute CVA (cerebrovascular accident) [facility])    Karon Freeman MD    Office Visit    11 months ago Type 2 diabetes mellitus with hyperglycemia, without long-term current use of insulin [facility])    Karon Freeman MD    Office Visit    11 months ago Type 2 diabetes mellitus with hyperglycemia, without long-term current use of insulin ([facility]    Jackie Boykin, Hilda Aguirre MD    Office Visit          Future Appointments         Provider Department Appt Notes    In 1 week MD Mica Mosqueda, [address], [place] np dm ee, appt sched by pt's wife, policy informed    In 3 weeks MD Mica Vasquez, [address] SUPERVISIT - LAST PX 2/15/22               Passed - Last BP reading less than 140/90     BP Readings from Last 1 Encounters:  10/18/22 : 132/74                Passed - In person appointment or virtual visit in the past 6 months     Recent Outpatient Visits              2 months ago COVID-19 Maurice Obrien, Toña Sheth MD    Telemedicine    6 months ago Acute CVA (cerebrovascular accident) [facility])    [address] [facility], [address], Kadi Alcantar MD    Office Visit    6 months ago Acute CVA (cerebrovascular accident) [facility])    Baljeet Broderick MD    Office Visit    11 months ago Type 2 diabetes mellitus with hyperglycemia, without long-term current use of insulin [facility])    Baljeet Broderick MD    Office Visit    11 months ago Type 2 diabetes mellitus with hyperglycemia, without long-term current use of insulin ([facility]    [address] [facility], [address], [place], Reyes Fan, MD    Office Visit          Future Appointments         Provider Department Appt Notes    In 1 week [place] [address], [place] np dm ee, appt sched by pt's wife, policy informed    In 3 weeks Dionisio Casiano  [facility], [address] [place] [address] SUPERVISIT - LAST [address] 2/15/22               Passed - EGFRCR or GFRNAA > 50     GFR Evaluation  EGFRCR: 103 , resulted on 9/9/2022               Future Appointments         Provider Department Appt Notes    In 1 week Landon Ko  [facility], [address], [place] np dm ee, appt sched by pt's wife, policy informed    In 3 weeks Dionisio Casiano  [facility] SUPERVISIT - LAST [address] 2/15/22          Recent Outpatient Visits              2 months ago COVID-19    Baljeet Broderick MD    Telemedicine    6 months ago Acute CVA (cerebrovascular accident) [facility])    [address] [facility], [address], Kadi Alcantar MD    Office Visit    6 months ago Acute CVA (cerebrovascular accident) [facility])    Cheryle Wilson MD    Office Visit    11 months ago Type 2 diabetes mellitus with hyperglycemia, without long-term current use of insulin [facility])    Cheryle Wilson MD    Office Visit    11 months ago Type 2 diabetes mellitus with hyperglycemia, without long-term current use of insulin [facility])    Lakia Gill, [address] [facility], [place], Nicole Bailey MD    Office Visit

## 2023-04-25 ENCOUNTER — OFFICE VISIT (OUTPATIENT)
Dept: OPHTHALMOLOGY | Facility: CLINIC | Age: 68
End: 2023-04-25
Payer: MEDICARE

## 2023-04-25 DIAGNOSIS — H25.13 AGE-RELATED NUCLEAR CATARACT OF BOTH EYES: ICD-10-CM

## 2023-04-25 DIAGNOSIS — E11.9 DIABETES MELLITUS TYPE 2 WITHOUT RETINOPATHY (HCC): Primary | ICD-10-CM

## 2023-04-25 PROCEDURE — 92004 COMPRE OPH EXAM NEW PT 1/>: CPT | Performed by: OPHTHALMOLOGY

## 2023-04-25 NOTE — ASSESSMENT & PLAN NOTE
Discussed early cataracts with patient. Told patient that cataracts are age appropriate and they are not surgical at this time. No treatment recommended at this time. Continue with same glasses Rx.

## 2023-04-25 NOTE — PATIENT INSTRUCTIONS
Diabetes mellitus type 2 without retinopathy ([facility]  Diabetes type II: no background of retinopathy, no signs of neovascularization noted. Discussed ocular and systemic benefits of blood sugar control. Diagnosis and treatment discussed in detail with patient. Age-related nuclear cataract of both eyes  Discussed early cataracts with patient. Told patient that cataracts are age appropriate and they are not surgical at this time. No treatment recommended at this time. Continue with same glasses Rx. PT MORE AROUSABLE, OFFERED TOILETING, PT DECLINES AT THIS TIME. STS SHE WANTS
TO GO BACK TO SLEEP. BED ALARM ON, CALL LIGHT WTIHIN REACH. KELLIE VEST REMAINS
IN PLACE AS PT FORGETFUL TO LIMITATIONS, DEC LOC.

## 2023-04-26 RX ORDER — CLOPIDOGREL BISULFATE 75 MG/1
TABLET ORAL
Qty: 90 TABLET | Refills: 1 | Status: SHIPPED | OUTPATIENT
Start: 2023-04-26

## 2023-04-26 NOTE — TELEPHONE ENCOUNTER
Message noted: Chart reviewed and may refill medication as requested. Prescription sent to listed pharmacy. Pharmacy to notify patient.  Pt notified through [facility]

## 2023-05-01 NOTE — TELEPHONE ENCOUNTER
Pt scheduled an appt and is requesting for metformin medicine pt is running very low please follow up

## 2023-05-13 ENCOUNTER — OFFICE VISIT (OUTPATIENT)
Dept: FAMILY MEDICINE CLINIC | Facility: CLINIC | Age: 68
End: 2023-05-13

## 2023-05-13 VITALS
HEART RATE: 79 BPM | TEMPERATURE: 98 F | DIASTOLIC BLOOD PRESSURE: 82 MMHG | RESPIRATION RATE: 16 BRPM | BODY MASS INDEX: 24.79 KG/M2 | HEIGHT: 72 IN | WEIGHT: 183 LBS | SYSTOLIC BLOOD PRESSURE: 158 MMHG

## 2023-05-13 DIAGNOSIS — Z12.11 COLON CANCER SCREENING: ICD-10-CM

## 2023-05-13 DIAGNOSIS — E78.5 DYSLIPIDEMIA: ICD-10-CM

## 2023-05-13 DIAGNOSIS — Z12.5 SCREENING PSA (PROSTATE SPECIFIC ANTIGEN): ICD-10-CM

## 2023-05-13 DIAGNOSIS — Z00.00 MEDICARE ANNUAL WELLNESS VISIT, SUBSEQUENT: Primary | ICD-10-CM

## 2023-05-13 DIAGNOSIS — E11.65 TYPE 2 DIABETES MELLITUS WITH HYPERGLYCEMIA, WITHOUT LONG-TERM CURRENT USE OF INSULIN (HCC): ICD-10-CM

## 2023-05-13 DIAGNOSIS — Z86.73 HISTORY OF CVA (CEREBROVASCULAR ACCIDENT) WITHOUT RESIDUAL DEFICITS: ICD-10-CM

## 2023-05-13 DIAGNOSIS — I10 HYPERTENSION, UNSPECIFIED TYPE: ICD-10-CM

## 2023-05-13 RX ORDER — NIRMATRELVIR AND RITONAVIR 300-100 MG
KIT ORAL
COMMUNITY
Start: 2023-02-07 | End: 2023-05-13

## 2023-05-22 DIAGNOSIS — E11.65 TYPE 2 DIABETES MELLITUS WITH HYPERGLYCEMIA, WITHOUT LONG-TERM CURRENT USE OF INSULIN (HCC): ICD-10-CM

## 2023-05-26 RX ORDER — LANCETS
EACH MISCELLANEOUS
Qty: 200 EACH | Refills: 1 | Status: SHIPPED | OUTPATIENT
Start: 2023-05-26

## 2023-06-03 ENCOUNTER — LAB ENCOUNTER (OUTPATIENT)
Dept: LAB | Age: 68
End: 2023-06-03
Attending: FAMILY MEDICINE
Payer: MEDICARE

## 2023-06-03 DIAGNOSIS — E78.5 HYPERLIPIDEMIA: ICD-10-CM

## 2023-06-03 DIAGNOSIS — Z12.5 SCREENING FOR PROSTATE CANCER: ICD-10-CM

## 2023-06-03 DIAGNOSIS — E11.65 POORLY CONTROLLED DIABETES MELLITUS (HCC): Primary | ICD-10-CM

## 2023-06-03 LAB
ALBUMIN SERPL-MCNC: 3.9 G/DL (ref 3.4–5)
ALBUMIN/GLOB SERPL: 1 {RATIO} (ref 1–2)
ALP LIVER SERPL-CCNC: 64 U/L
ALT SERPL-CCNC: 128 U/L
ANION GAP SERPL CALC-SCNC: 1 MMOL/L (ref 0–18)
AST SERPL-CCNC: 91 U/L (ref 15–37)
BILIRUB SERPL-MCNC: 0.5 MG/DL (ref 0.1–2)
BUN BLD-MCNC: 13 MG/DL (ref 7–18)
BUN/CREAT SERPL: 15.9 (ref 10–20)
CALCIUM BLD-MCNC: 9.4 MG/DL (ref 8.5–10.1)
CHLORIDE SERPL-SCNC: 107 MMOL/L (ref 98–112)
CHOLEST SERPL-MCNC: 143 MG/DL (ref ?–200)
CO2 SERPL-SCNC: 29 MMOL/L (ref 21–32)
CREAT BLD-MCNC: 0.82 MG/DL
CREAT UR-SCNC: 73.3 MG/DL
DEPRECATED RDW RBC AUTO: 39.2 FL (ref 35.1–46.3)
ERYTHROCYTE [DISTWIDTH] IN BLOOD BY AUTOMATED COUNT: 11.5 % (ref 11–15)
EST. AVERAGE GLUCOSE BLD GHB EST-MCNC: 131 MG/DL (ref 68–126)
FASTING PATIENT LIPID ANSWER: YES
FASTING STATUS PATIENT QL REPORTED: YES
GFR SERPLBLD BASED ON 1.73 SQ M-ARVRAT: 97 ML/MIN/1.73M2 (ref 60–?)
GLOBULIN PLAS-MCNC: 4.1 G/DL (ref 2.8–4.4)
GLUCOSE BLD-MCNC: 147 MG/DL (ref 70–99)
HBA1C MFR BLD: 6.2 % (ref ?–5.7)
HCT VFR BLD AUTO: 44.6 %
HDLC SERPL-MCNC: 56 MG/DL (ref 40–59)
HGB BLD-MCNC: 15.3 G/DL
LDLC SERPL CALC-MCNC: 73 MG/DL (ref ?–100)
MCH RBC QN AUTO: 31.8 PG (ref 26–34)
MCHC RBC AUTO-ENTMCNC: 34.3 G/DL (ref 31–37)
MCV RBC AUTO: 92.7 FL
MICROALBUMIN UR-MCNC: 2.21 MG/DL
MICROALBUMIN/CREAT 24H UR-RTO: 30.2 UG/MG (ref ?–30)
NONHDLC SERPL-MCNC: 87 MG/DL (ref ?–130)
OSMOLALITY SERPL CALC.SUM OF ELEC: 287 MOSM/KG (ref 275–295)
PLATELET # BLD AUTO: 243 10(3)UL (ref 150–450)
POTASSIUM SERPL-SCNC: 4.6 MMOL/L (ref 3.5–5.1)
PROT SERPL-MCNC: 8 G/DL (ref 6.4–8.2)
PSA SERPL-MCNC: 0.91 NG/ML (ref ?–4)
RBC # BLD AUTO: 4.81 X10(6)UL
SODIUM SERPL-SCNC: 137 MMOL/L (ref 136–145)
TRIGL SERPL-MCNC: 67 MG/DL (ref 30–149)
VLDLC SERPL CALC-MCNC: 10 MG/DL (ref 0–30)
WBC # BLD AUTO: 6.7 X10(3) UL (ref 4–11)

## 2023-06-03 PROCEDURE — 83036 HEMOGLOBIN GLYCOSYLATED A1C: CPT | Performed by: FAMILY MEDICINE

## 2023-06-03 PROCEDURE — 82570 ASSAY OF URINE CREATININE: CPT | Performed by: FAMILY MEDICINE

## 2023-06-03 PROCEDURE — 84153 ASSAY OF PSA TOTAL: CPT | Performed by: FAMILY MEDICINE

## 2023-06-03 PROCEDURE — 3044F HG A1C LEVEL LT 7.0%: CPT | Performed by: FAMILY MEDICINE

## 2023-06-03 PROCEDURE — 85027 COMPLETE CBC AUTOMATED: CPT | Performed by: FAMILY MEDICINE

## 2023-06-03 PROCEDURE — 82043 UR ALBUMIN QUANTITATIVE: CPT | Performed by: FAMILY MEDICINE

## 2023-06-03 PROCEDURE — 80053 COMPREHEN METABOLIC PANEL: CPT | Performed by: FAMILY MEDICINE

## 2023-06-03 PROCEDURE — 3061F NEG MICROALBUMINURIA REV: CPT | Performed by: FAMILY MEDICINE

## 2023-06-03 PROCEDURE — 3060F POS MICROALBUMINURIA REV: CPT | Performed by: FAMILY MEDICINE

## 2023-06-03 PROCEDURE — 36415 COLL VENOUS BLD VENIPUNCTURE: CPT | Performed by: FAMILY MEDICINE

## 2023-06-03 PROCEDURE — 80061 LIPID PANEL: CPT | Performed by: FAMILY MEDICINE

## 2023-06-08 ENCOUNTER — OFFICE VISIT (OUTPATIENT)
Dept: FAMILY MEDICINE CLINIC | Facility: CLINIC | Age: 68
End: 2023-06-08

## 2023-06-08 VITALS
TEMPERATURE: 98 F | RESPIRATION RATE: 16 BRPM | HEIGHT: 72 IN | SYSTOLIC BLOOD PRESSURE: 153 MMHG | DIASTOLIC BLOOD PRESSURE: 87 MMHG | BODY MASS INDEX: 24.65 KG/M2 | WEIGHT: 182 LBS | HEART RATE: 72 BPM

## 2023-06-08 DIAGNOSIS — E11.9 CONTROLLED TYPE 2 DIABETES MELLITUS WITHOUT COMPLICATION, WITHOUT LONG-TERM CURRENT USE OF INSULIN (HCC): ICD-10-CM

## 2023-06-08 DIAGNOSIS — R79.89 ELEVATED LFTS: ICD-10-CM

## 2023-06-08 DIAGNOSIS — I10 HYPERTENSION, UNSPECIFIED TYPE: Primary | ICD-10-CM

## 2023-06-08 DIAGNOSIS — E78.5 HYPERLIPIDEMIA, UNSPECIFIED HYPERLIPIDEMIA TYPE: ICD-10-CM

## 2023-06-08 PROCEDURE — 99214 OFFICE O/P EST MOD 30 MIN: CPT | Performed by: FAMILY MEDICINE

## 2023-06-08 PROCEDURE — 3079F DIAST BP 80-89 MM HG: CPT | Performed by: FAMILY MEDICINE

## 2023-06-08 PROCEDURE — 3077F SYST BP >= 140 MM HG: CPT | Performed by: FAMILY MEDICINE

## 2023-06-08 PROCEDURE — 1126F AMNT PAIN NOTED NONE PRSNT: CPT | Performed by: FAMILY MEDICINE

## 2023-06-08 PROCEDURE — 1160F RVW MEDS BY RX/DR IN RCRD: CPT | Performed by: FAMILY MEDICINE

## 2023-06-08 PROCEDURE — 1159F MED LIST DOCD IN RCRD: CPT | Performed by: FAMILY MEDICINE

## 2023-06-08 PROCEDURE — 3008F BODY MASS INDEX DOCD: CPT | Performed by: FAMILY MEDICINE

## 2023-06-08 RX ORDER — LISINOPRIL 30 MG/1
30 TABLET ORAL DAILY
Qty: 90 TABLET | Refills: 1 | Status: SHIPPED | OUTPATIENT
Start: 2023-06-08

## 2023-06-14 ENCOUNTER — OFFICE VISIT (OUTPATIENT)
Dept: ENDOCRINOLOGY CLINIC | Facility: CLINIC | Age: 68
End: 2023-06-14

## 2023-06-14 VITALS
SYSTOLIC BLOOD PRESSURE: 159 MMHG | HEIGHT: 72 IN | BODY MASS INDEX: 24.65 KG/M2 | WEIGHT: 182 LBS | HEART RATE: 78 BPM | DIASTOLIC BLOOD PRESSURE: 91 MMHG

## 2023-06-14 DIAGNOSIS — I10 HYPERTENSION, UNSPECIFIED TYPE: ICD-10-CM

## 2023-06-14 DIAGNOSIS — E78.5 DYSLIPIDEMIA: ICD-10-CM

## 2023-06-14 DIAGNOSIS — E11.65 TYPE 2 DIABETES MELLITUS WITH HYPERGLYCEMIA, WITHOUT LONG-TERM CURRENT USE OF INSULIN (HCC): Primary | ICD-10-CM

## 2023-06-14 LAB
GLUCOSE BLOOD: 109
TEST STRIP LOT #: NORMAL NUMERIC

## 2023-06-14 PROCEDURE — 3077F SYST BP >= 140 MM HG: CPT | Performed by: INTERNAL MEDICINE

## 2023-06-14 PROCEDURE — 3008F BODY MASS INDEX DOCD: CPT | Performed by: INTERNAL MEDICINE

## 2023-06-14 PROCEDURE — 82947 ASSAY GLUCOSE BLOOD QUANT: CPT | Performed by: INTERNAL MEDICINE

## 2023-06-14 PROCEDURE — 3080F DIAST BP >= 90 MM HG: CPT | Performed by: INTERNAL MEDICINE

## 2023-06-14 PROCEDURE — 99214 OFFICE O/P EST MOD 30 MIN: CPT | Performed by: INTERNAL MEDICINE

## 2023-06-14 PROCEDURE — 1159F MED LIST DOCD IN RCRD: CPT | Performed by: INTERNAL MEDICINE

## 2023-07-08 ENCOUNTER — LAB ENCOUNTER (OUTPATIENT)
Dept: LAB | Age: 68
End: 2023-07-08
Attending: FAMILY MEDICINE
Payer: MEDICARE

## 2023-07-08 LAB
ALBUMIN SERPL-MCNC: 3.9 G/DL (ref 3.4–5)
ALP LIVER SERPL-CCNC: 55 U/L
ALT SERPL-CCNC: 39 U/L
AST SERPL-CCNC: 24 U/L (ref 15–37)
BILIRUB DIRECT SERPL-MCNC: <0.1 MG/DL (ref 0–0.2)
BILIRUB SERPL-MCNC: 0.3 MG/DL (ref 0.1–2)
PROT SERPL-MCNC: 8 G/DL (ref 6.4–8.2)

## 2023-07-08 PROCEDURE — 80076 HEPATIC FUNCTION PANEL: CPT | Performed by: FAMILY MEDICINE

## 2023-07-08 PROCEDURE — 36415 COLL VENOUS BLD VENIPUNCTURE: CPT | Performed by: FAMILY MEDICINE

## 2023-07-27 DIAGNOSIS — E11.65 TYPE 2 DIABETES MELLITUS WITH HYPERGLYCEMIA, WITHOUT LONG-TERM CURRENT USE OF INSULIN (HCC): ICD-10-CM

## 2023-07-28 NOTE — TELEPHONE ENCOUNTER
LOV: 6/14/2023    RTC: 12 Months    FU: No FU Appt Scheduled    Last Refill: 3/10/2023    3 Month Supply Pending

## 2023-08-08 RX ORDER — ATORVASTATIN CALCIUM 80 MG/1
80 TABLET, FILM COATED ORAL NIGHTLY
Qty: 90 TABLET | Refills: 1 | Status: SHIPPED | OUTPATIENT
Start: 2023-08-08

## 2023-08-08 NOTE — TELEPHONE ENCOUNTER
Message noted: Chart reviewed and may refill medication as requested. Prescription sent to listed pharmacy. Pharmacy to notify patient.  Pt notified through [facility]

## 2023-08-28 DIAGNOSIS — E11.65 TYPE 2 DIABETES MELLITUS WITH HYPERGLYCEMIA, WITHOUT LONG-TERM CURRENT USE OF INSULIN (HCC): ICD-10-CM

## 2023-09-06 RX ORDER — LISINOPRIL 30 MG/1
30 TABLET ORAL DAILY
Qty: 90 TABLET | Refills: 3 | Status: SHIPPED | OUTPATIENT
Start: 2023-09-06

## 2023-09-06 NOTE — TELEPHONE ENCOUNTER
Please review; protocol failed. Requested Prescriptions   Pending Prescriptions Disp Refills    lisinopril 30 MG Oral Tab 90 tablet 1     Sig: Take 1 tablet (30 mg total) by mouth daily.        Hypertensive Medications Protocol Failed - 9/5/2023  3:49 PM        Failed - Last BP reading less than 140/90     BP Readings from Last 1 Encounters:  06/14/23 : (!) 159/91              Passed - In person appointment in the past 12 or next 3 months     Recent Outpatient Visits              2 months ago Type 2 diabetes mellitus with hyperglycemia, without long-term current use of insulin ([facility]    [facility], [address] [facility], Walker Larsen MD    Office Visit    3 months ago Hypertension, unspecified type    Staci Stone MD    Office Visit    3 months ago Elliot Ott annual wellness visit, subsequent    Staci Stone MD    Office Visit    4 months ago Diabetes mellitus type 2 without retinopathy [facility])    [facility], [address], Kasi Guerrier MD    Office Visit    7 months ago COVID-19    [facility], [address] [facility], Angela Duane, MD    Telemedicine          Future Appointments         Provider Department Appt Notes    In 7 months Ester Solis MD [facility], [address], [address] EP/ DM EE               Passed - CMP or BMP in past 6 months     Recent Results (from the past 4392 hour(s))   COMP METABOLIC PANEL (14)    Collection Time: 06/03/23  8:24 AM   Result Value Ref Range    Glucose 147 (H) 70 - 99 mg/dL    Sodium 137 136 - 145 mmol/L    Potassium 4.6 3.5 - 5.1 mmol/L    Chloride 107 98 - 112 mmol/L    CO2 29.0 21.0 - 32.0 mmol/L    Anion Gap 1 0 - 18 mmol/L    BUN 13 7 - 18 mg/dL    Creatinine 0.82 0.70 - 1.30 mg/dL    BUN/CREA Ratio 15.9 10.0 - 20.0    Calcium, Total 9.4 8.5 - 10.1 mg/dL Calculated Osmolality 287 275 - 295 mOsm/kg    eGFR-Cr 97 >=60 mL/min/1.73m2     (H) 16 - 61 U/L    AST 91 (H) 15 - 37 U/L    Alkaline Phosphatase 64 45 - 117 U/L    Bilirubin, Total 0.5 0.1 - 2.0 mg/dL    Total Protein 8.0 6.4 - 8.2 g/dL    Albumin 3.9 3.4 - 5.0 g/dL    Globulin  4.1 2.8 - 4.4 g/dL    A/G Ratio 1.0 1.0 - 2.0    Patient Fasting for CMP? Yes      *Note: Due to a large number of results and/or encounters for the requested time period, some results have not been displayed. A complete set of results can be found in Results Review.                Passed - In person appointment or virtual visit in the past 6 months     Recent Outpatient Visits              2 months ago Type 2 diabetes mellitus with hyperglycemia, without long-term current use of insulin ([facility]    [address], [address] [facility], De Kitchen MD    Office Visit    3 months ago Hypertension, unspecified type    Kj Yan MD    Office Visit    3 months ago Elliot Ott annual wellness visit, subsequent    Kj Yan MD    Office Visit    4 months ago Diabetes mellitus type 2 without retinopathy [facility])    [address], [address] [facility],[address], Timi Palma MD    Office Visit    7 months ago COVID-19    Kj Yan MD    Telemedicine          Future Appointments         Provider Department Appt Notes    In 7 months Estell Koyanagi, MD [address], [address], [place] [employer] EP/ DM EE               Passed - EGFRCR or GFRNAA > 50     GFR Evaluation  EGFRCR: 97 , resulted on 6/3/2023             Recent Outpatient Visits              2 months ago Type 2 diabetes mellitus with hyperglycemia, without long-term current use of insulin [facility])    [address], [facility] Alison Hillman MD    Office Visit    3 months ago Hypertension, unspecified type    Darrell Alva MD    Office Visit    3 months ago Medicare annual wellness visit, subsequent    Darrell Alva MD    Office Visit    4 months ago Diabetes mellitus type 2 without retinopathy [facility])    [address], [address], [place] Sharifa Crump MD    Office Visit    7 months ago COVID-19    Darrell Alva MD    Telemedicine          Future Appointments         Provider Department Appt Notes    In 7 months Sharifa Crump MD [address], [address], [facility] EP/ DM EE

## 2023-09-06 NOTE — TELEPHONE ENCOUNTER
Message noted: Chart reviewed and may refill medication as requested. Prescription sent to listed pharmacy. Pharmacy to notify patient.  Pt notified through [facility]

## 2023-10-30 DIAGNOSIS — E11.65 TYPE 2 DIABETES MELLITUS WITH HYPERGLYCEMIA, WITHOUT LONG-TERM CURRENT USE OF INSULIN (HCC): ICD-10-CM

## 2023-11-01 NOTE — TELEPHONE ENCOUNTER
Refilled per protocol  LOV: 6/14/23  Last refill: 07/29/23  RTC: 07/14/24  Last HgbA1c: 6.2 on 6/3/23

## 2023-11-06 ENCOUNTER — TELEPHONE (OUTPATIENT)
Dept: FAMILY MEDICINE CLINIC | Facility: CLINIC | Age: 68
End: 2023-11-06

## 2023-11-28 ENCOUNTER — OFFICE VISIT (OUTPATIENT)
Dept: FAMILY MEDICINE CLINIC | Facility: CLINIC | Age: 68
End: 2023-11-28

## 2023-11-28 VITALS
BODY MASS INDEX: 25.6 KG/M2 | HEART RATE: 70 BPM | DIASTOLIC BLOOD PRESSURE: 78 MMHG | WEIGHT: 189 LBS | RESPIRATION RATE: 16 BRPM | TEMPERATURE: 97 F | SYSTOLIC BLOOD PRESSURE: 148 MMHG | HEIGHT: 72 IN

## 2023-11-28 DIAGNOSIS — I10 ESSENTIAL HYPERTENSION: Primary | ICD-10-CM

## 2023-11-28 PROCEDURE — 1159F MED LIST DOCD IN RCRD: CPT | Performed by: FAMILY MEDICINE

## 2023-11-28 PROCEDURE — 1160F RVW MEDS BY RX/DR IN RCRD: CPT | Performed by: FAMILY MEDICINE

## 2023-11-28 PROCEDURE — 3008F BODY MASS INDEX DOCD: CPT | Performed by: FAMILY MEDICINE

## 2023-11-28 PROCEDURE — 1126F AMNT PAIN NOTED NONE PRSNT: CPT | Performed by: FAMILY MEDICINE

## 2023-11-28 PROCEDURE — 3077F SYST BP >= 140 MM HG: CPT | Performed by: FAMILY MEDICINE

## 2023-11-28 PROCEDURE — 3078F DIAST BP <80 MM HG: CPT | Performed by: FAMILY MEDICINE

## 2023-11-28 PROCEDURE — 99213 OFFICE O/P EST LOW 20 MIN: CPT | Performed by: FAMILY MEDICINE

## 2023-11-28 PROCEDURE — 1170F FXNL STATUS ASSESSED: CPT | Performed by: FAMILY MEDICINE

## 2023-11-28 RX ORDER — AMLODIPINE BESYLATE 5 MG/1
5 TABLET ORAL DAILY
Qty: 90 TABLET | Refills: 1 | Status: SHIPPED | OUTPATIENT
Start: 2023-11-28

## 2023-11-28 NOTE — PROGRESS NOTES
Subjective:   Patient ID: Chivo Monk is a 79year old male. Patient is here for follow up for chronic medical issues- hypertension. The patient is compliant with medications and no side effects. The patient states blood pressure has been in 938'P systolic at home. Diastolic good. Patient sees endocrine for history of diabetes/  Has been doing well and stable. Last hgba1c at 6.2  Diet has been good. History/Other:   Review of Systems   Constitutional:  Negative for fever. Respiratory:  Negative for shortness of breath. Cardiovascular:  Negative for chest pain and palpitations. Gastrointestinal:  Negative for abdominal pain. Musculoskeletal:  Negative for arthralgias. Neurological:  Negative for dizziness and headaches. Psychiatric/Behavioral:  Negative for dysphoric mood. The patient is not nervous/anxious. Current Outpatient Medications   Medication Sig Dispense Refill    amLODIPine 5 MG Oral Tab Take 1 tablet (5 mg total) by mouth daily. 90 tablet 1    metFORMIN HCl 1000 MG Oral Tab Take 1 tablet (1,000 mg total) by mouth 2 (two) times daily with meals. 180 tablet 1    lisinopril 30 MG Oral Tab Take 1 tablet (30 mg total) by mouth daily. 90 tablet 3    Blood Glucose Monitoring Suppl Supplies Does not apply Misc Use as directed to check blood glucose 2 times daily (fasting and 2 hours after biggest meal) 200 each 0    ATORVASTATIN 80 MG Oral Tab TAKE 1 TABLET(80 MG) BY MOUTH EVERY NIGHT 90 tablet 1    Accu-Chek Softclix Lancets Does not apply Misc Please check blood sugars twice daily 200 each 1    Blood Glucose Monitoring Suppl Does not apply Kit Use as directed to check blood glucose 2 times daily (fasting and 2 hours after biggest meal) 1 kit 0    aspirin 81 MG Oral Tab EC Take 1 tablet (81 mg total) by mouth daily.   0    Blood Glucose Monitoring Suppl Does not apply Misc Use as directed to check blood glucose 2 times daily (fasting and 2 hours after biggest meal) 200 each 0 Allergies:No Known Allergies    Objective:   Physical Exam  Constitutional:       Appearance: Normal appearance. Cardiovascular:      Rate and Rhythm: Normal rate and regular rhythm. Neurological:      Mental Status: He is alert. Assessment & Plan:   1. Essential hypertension:  - After discussion, will start amlodipine as discussed and continue lisinopril; discussed benefits and potential side effects; to call if problems or side effects; follow up in one month to reevaluate. To monitor blood pressure at home; To call if any persistent elevation of blood pressure; Discussed good diet and activity; Follow up as needed. No orders of the defined types were placed in this encounter. Meds This Visit:  Requested Prescriptions     Signed Prescriptions Disp Refills    amLODIPine 5 MG Oral Tab 90 tablet 1     Sig: Take 1 tablet (5 mg total) by mouth daily.        Imaging & Referrals:  None

## 2024-01-30 DIAGNOSIS — E11.65 TYPE 2 DIABETES MELLITUS WITH HYPERGLYCEMIA, WITHOUT LONG-TERM CURRENT USE OF INSULIN (HCC): ICD-10-CM

## 2024-02-02 NOTE — TELEPHONE ENCOUNTER
LOV;06/14/23    RTC;1year    FU;No F/U    Pending Monthly Supply;order pending, approve if appropriate.

## 2024-02-14 RX ORDER — ATORVASTATIN CALCIUM 80 MG/1
80 TABLET, FILM COATED ORAL NIGHTLY
Qty: 90 TABLET | Refills: 3 | Status: SHIPPED | OUTPATIENT
Start: 2024-02-14

## 2024-02-14 NOTE — TELEPHONE ENCOUNTER
Refill passed per [facility] protocol.  Requested Prescriptions   Pending Prescriptions Disp Refills    ATORVASTATIN 80 MG Oral Tab [Pharmacy Med Name: ATORVASTATIN 80MG TABLETS] 90 tablet 1     Sig: TAKE 1 TABLET(80 MG) BY MOUTH EVERY NIGHT       Cholesterol Medication Protocol Passed - 2/12/2024  2:50 PM        Passed - ALT < 80     Lab Results   Component Value Date    ALT 39 07/08/2023             Passed - ALT resulted within past year        Passed - Lipid panel within past 12 months     Lab Results   Component Value Date    CHOLEST 143 06/03/2023    TRIG 67 06/03/2023    HDL 56 06/03/2023    LDL 73 06/03/2023    VLDL 10 06/03/2023    NONHDLC 87 06/03/2023             Passed - In person appointment or virtual visit in the past 12 mos or appointment in next 3 mos     Recent Outpatient Visits              2 months ago Essential hypertension    [facility]    Office Visit    8 months ago Type 2 diabetes mellitus with hyperglycemia, without long-term current use of insulin (HCC)    [facility]    Office Visit    8 months ago Hypertension, unspecified type    [facility] Ney Luna MD    Office Visit    9 months ago Medicare annual wellness visit, subsequent    [facility]    Office Visit    9 months ago Diabetes mellitus type 2 without retinopathy (HCC)    [facility] Colt Hill MD    Office Visit          Future Appointments         Provider Department Appt Notes    In 2 months Colt Hill MD [facility] EP/ DM EE                  Recent Outpatient Visits              2 months ago Essential hypertension    [facility] Jeremy  MD Ney    Office Visit    8 months ago Type 2 diabetes mellitus with hyperglycemia, without long-term current use of insulin (HCC)    [facility] Shira Jeronimo MD    Office Visit    8 months ago Hypertension, unspecified type    [facility] Ney Gomez MD    Office Visit    9 months ago Medicare annual wellness visit, subsequent    [facility] Ney Gomez MD    Office Visit    9 months ago Diabetes mellitus type 2 without retinopathy (HCC)    [facility]          Future Appointments         Provider Department Appt Notes    In 2 months [facility]

## 2024-02-26 ENCOUNTER — TELEPHONE (OUTPATIENT)
Dept: FAMILY MEDICINE CLINIC | Facility: CLINIC | Age: 69
End: 2024-02-26

## 2024-02-27 DIAGNOSIS — E11.65 TYPE 2 DIABETES MELLITUS WITH HYPERGLYCEMIA, WITHOUT LONG-TERM CURRENT USE OF INSULIN (HCC): ICD-10-CM

## 2024-02-27 RX ORDER — LANCETS
EACH MISCELLANEOUS
Qty: 200 EACH | Refills: 1 | Status: SHIPPED | OUTPATIENT
Start: 2024-02-27

## 2024-02-27 NOTE — TELEPHONE ENCOUNTER
Endocrine Refill protocol for Glucose testing supplies       Protocol Criteria: Passed  Appointment with Endocrinology completed in the last 12 months or scheduled in the next 6 months     Verify appointment has been completed or scheduled in the appropriate timeline. If so can send a 90 day supply with 1 refill.        Last completed office visit: 6/14/2023 RTC in 1 year  Next scheduled Follow up: none

## 2024-04-23 ENCOUNTER — OFFICE VISIT (OUTPATIENT)
Dept: OPHTHALMOLOGY | Facility: CLINIC | Age: 69
End: 2024-04-23
Payer: MEDICARE

## 2024-04-23 ENCOUNTER — TELEPHONE (OUTPATIENT)
Dept: FAMILY MEDICINE CLINIC | Facility: CLINIC | Age: 69
End: 2024-04-23

## 2024-04-23 DIAGNOSIS — H25.13 AGE-RELATED NUCLEAR CATARACT OF BOTH EYES: ICD-10-CM

## 2024-04-23 DIAGNOSIS — E11.9 DIABETES MELLITUS TYPE 2 WITHOUT RETINOPATHY (HCC): Primary | ICD-10-CM

## 2024-04-23 PROCEDURE — 1160F RVW MEDS BY RX/DR IN RCRD: CPT | Performed by: OPHTHALMOLOGY

## 2024-04-23 PROCEDURE — 3072F LOW RISK FOR RETINOPATHY: CPT | Performed by: OPHTHALMOLOGY

## 2024-04-23 PROCEDURE — 92014 COMPRE OPH EXAM EST PT 1/>: CPT | Performed by: OPHTHALMOLOGY

## 2024-04-23 PROCEDURE — 1159F MED LIST DOCD IN RCRD: CPT | Performed by: OPHTHALMOLOGY

## 2024-04-23 PROCEDURE — 2023F DILAT RTA XM W/O RTNOPTHY: CPT | Performed by: OPHTHALMOLOGY

## 2024-04-23 NOTE — PATIENT INSTRUCTIONS
Diabetes mellitus type 2 without retinopathy (HCC)  Diabetes type II: no background of retinopathy, no signs of neovascularization noted.  Discussed ocular and systemic benefits of blood sugar control.  Diagnosis and treatment discussed in detail with patient.    Will see patient in 1 year for a diabetic exam    Age-related nuclear cataract of both eyes  Discussed early cataracts with patient. Told patient that cataracts are age appropriate and they are not surgical at this time.  No treatment recommended at this time.     Continue with same glasses Rx.

## 2024-04-23 NOTE — PROGRESS NOTES
Lawrence Caban is a 69 year old male.    HPI:     HPI    Pt in today for a diabetic eye exam. Pt states vision is stable with his current glasses.      Pt has been a diabetic for 2 years      Pt's diabetes is currently controlled by pills   Pt checks BS 1-2x a day   Pt's last blood sugar was 104 yesterday afternoon  Last HA1C was 6.2 on 6/03/23  Endocrinologist: Dr. Jeronimo   Last edited by Lo Oslen OT on 4/23/2024  4:32 PM.        Patient History:  Past Medical History:    Diabetes (HCC)       Surgical History: Lawrence Caban has no past surgical history on file.    Family History   Problem Relation Age of Onset    Heart Disorder Mother     Diabetes Mother     Heart Disorder Father     Glaucoma Neg     Macular degeneration Neg        Social History:   Social History     Socioeconomic History    Marital status:    Tobacco Use    Smoking status: Never    Smokeless tobacco: Never   Substance and Sexual Activity    Alcohol use: Yes    Drug use: Never       Medications:  Current Outpatient Medications   Medication Sig Dispense Refill    Blood Glucose Monitoring Suppl Supplies Does not apply Misc Use as directed to check blood glucose 2 times daily (fasting and 2 hours after biggest meal) 200 each 1    Accu-Chek Softclix Lancets Does not apply Misc Please check blood sugars twice daily 200 each 1    atorvastatin 80 MG Oral Tab Take 1 tablet (80 mg total) by mouth nightly. 90 tablet 3    metFORMIN HCl 1000 MG Oral Tab Take 1 tablet (1,000 mg total) by mouth 2 (two) times daily with meals. 180 tablet 1    amLODIPine 5 MG Oral Tab Take 1 tablet (5 mg total) by mouth daily. 90 tablet 1    lisinopril 30 MG Oral Tab Take 1 tablet (30 mg total) by mouth daily. 90 tablet 3    Blood Glucose Monitoring Suppl Does not apply Kit Use as directed to check blood glucose 2 times daily (fasting and 2 hours after biggest meal) 1 kit 0    aspirin 81 MG Oral Tab EC Take 1 tablet (81 mg total) by mouth daily.  0    Blood  Glucose Monitoring Suppl Does not apply Misc Use as directed to check blood glucose 2 times daily (fasting and 2 hours after biggest meal) 200 each 0       Allergies:  No Known Allergies    ROS:     ROS    Positive for: Eyes  Last edited by Lo Olsen OT on 4/23/2024  4:27 PM.          PHYSICAL EXAM:     Base Eye Exam       Visual Acuity (Snellen - Linear)         Right Left    Dist cc 20/25 +2 20/25 -2    Near cc 20/25 20/25      Correction: Glasses              Tonometry (Icare, 4:41 PM)         Right Left    Pressure 15 17              Pupils         Pupils    Right PERRL    Left PERRL              Extraocular Movement         Right Left     Full, Ortho Full, Ortho              Neuro/Psych       Oriented x3: Yes    Mood/Affect: Normal              Dilation       Both eyes: 1.0% Mydriacyl and 2.5% Jermain Synephrine @ 4:43 PM                  Slit Lamp and Fundus Exam       Slit Lamp Exam         Right Left    Lids/Lashes Dermatochalasis, Meibomian gland dysfunction Dermatochalasis, Meibomian gland dysfunction    Conjunctiva/Sclera Nasal/temp pinguecula Nasal/temp pinguecula    Cornea 2+ Arcus 2+ Arcus    Anterior Chamber Deep and quiet Deep and quiet    Iris Normal Normal    Lens 2+ Nuclear sclerosis 2+ Nuclear sclerosis    Vitreous Clear Clear              Fundus Exam         Right Left    Disc Good rim, Temporal crescent Good rim, Temporal crescent    C/D Ratio 0.4 0.5    Macula Normal, no BDR Normal, no BDR    Vessels Normal Normal    Periphery Normal Normal                  Refraction       Wearing Rx         Sphere Cylinder Axis Add    Right +0.50 +0.75 170 +2.25    Left +1.50 +0.75 180 +2.25      Age: 5yrs    Type: Progressive bifocal   Patient does not want a refraction                    ASSESSMENT/PLAN:     Diagnoses and Plan:     Diabetes mellitus type 2 without retinopathy (HCC)  Diabetes type II: no background of retinopathy, no signs of neovascularization noted.  Discussed ocular and systemic  benefits of blood sugar control.  Diagnosis and treatment discussed in detail with patient.    Will see patient in 1 year for a diabetic exam    Age-related nuclear cataract of both eyes  Discussed early cataracts with patient. Told patient that cataracts are age appropriate and they are not surgical at this time.  No treatment recommended at this time.     Continue with same glasses Rx.       No orders of the defined types were placed in this encounter.      Meds This Visit:  Requested Prescriptions      No prescriptions requested or ordered in this encounter        Follow up instructions:  Return in about 1 year (around 4/23/2025) for Diabetic eye exam.    4/23/2024  Scribed by: Colt Hill MD

## 2024-05-22 NOTE — TELEPHONE ENCOUNTER
Please Review. Protocol Failed; No Protocol   BP Readings from Last 1 Encounters:   11/28/23 148/78     Recent Visits  Date Type Provider Dept   11/28/23 Office Visit Ney Luna MD [facility]   06/08/23 Office Visit Ney Luna MD [facility]   05/13/23 Office Visit Ney Luna MD [facility]   Showing recent visits within past 540 days with a meds authorizing provider and meeting all other requirements  Future Appointments  Date Type Provider Dept   05/28/24 Appointment Ney Luna MD [facility]   Showing future appointments within next 150 days with a meds authorizing provider and meeting all other requirements    Requested Prescriptions   Pending Prescriptions Disp Refills    AMLODIPINE 5 MG Oral Tab [Pharmacy Med Name: AMLODIPINE BESYLATE 5MG TABLETS] 90 tablet 1     Sig: TAKE 1 TABLET(5 MG) BY MOUTH DAILY       Hypertension Medications Protocol Failed - 5/20/2024 12:14 PM        Failed - Last BP reading less than 140/90     BP Readings from Last 1 Encounters:   11/28/23 148/78               Passed - CMP or BMP in past 12 months        Passed - In person appointment or virtual visit in the past 12 mos or appointment in next 3 mos     Recent Outpatient Visits              4 weeks ago Diabetes mellitus type 2 without retinopathy (HCC)    [facility] [place] Colt Hill MD    Office Visit    5 months ago [facility]    Office Visit    11 months ago Type 2 diabetes mellitus with hyperglycemia, without long-term current use of insulin (HCC)    [facility]    Office Visit    11 months ago Hypertension, unspecified type    [facility]    Office Visit    1 year ago Medicare annual wellness visit, subsequent    [place]  [facility]    [facility]          Future Appointments         Provider Department Appt Notes    In 6 days Ney Luna MD [facility], [address], [place] medicare physical/last physical 5-13-23 policy informed to pt    In 11 months Colt Hill MD [facility] EP/ DM EE                    Passed - EGFRCR or GFRNAA > 50     GFR Evaluation  EGFRCR: 97 , resulted on 6/3/2023                 Future Appointments         Provider Department Appt Notes    In 6 days Ney Luna MD [facility], [address], [place] medicare physical/last physical 5-13-23 policy informed to pt    In 11 months Colt Hill MD [facility] EP/ DM EE          Recent Outpatient Visits              4 weeks ago Diabetes mellitus type 2 without retinopathy (HCC)    [facility] Colt Hill MD    Office Visit    5 months ago [facility]    [facility] Visit    11 months ago Type 2 diabetes mellitus with hyperglycemia, without long-term current use of insulin (HCC)    [facility] [place] Shira Jeronimo MD    Office Visit    11 months ago Hypertension, unspecified type    [facility]    [facility]    1 year ago Medicare annual wellness visit, subsequent    [facility]    [facility] Visit

## 2024-05-23 RX ORDER — AMLODIPINE BESYLATE 5 MG/1
5 TABLET ORAL DAILY
Qty: 90 TABLET | Refills: 3 | Status: SHIPPED | OUTPATIENT
Start: 2024-05-23

## 2024-05-23 NOTE — TELEPHONE ENCOUNTER
Message noted: Chart reviewed and may refill medication as requested. Prescription sent to listed pharmacy. Pharmacy to notify patient. Pt notified through [employer]

## 2024-05-28 ENCOUNTER — OFFICE VISIT (OUTPATIENT)
Dept: FAMILY MEDICINE CLINIC | Facility: CLINIC | Age: 69
End: 2024-05-28

## 2024-05-28 VITALS
TEMPERATURE: 98 F | DIASTOLIC BLOOD PRESSURE: 76 MMHG | HEART RATE: 73 BPM | HEIGHT: 72 IN | WEIGHT: 187 LBS | OXYGEN SATURATION: 93 % | SYSTOLIC BLOOD PRESSURE: 136 MMHG | BODY MASS INDEX: 25.33 KG/M2

## 2024-05-28 DIAGNOSIS — Z12.5 SCREENING PSA (PROSTATE SPECIFIC ANTIGEN): ICD-10-CM

## 2024-05-28 DIAGNOSIS — Z00.00 ENCOUNTER FOR ANNUAL HEALTH EXAMINATION: ICD-10-CM

## 2024-05-28 DIAGNOSIS — Z86.73 HISTORY OF CVA (CEREBROVASCULAR ACCIDENT) WITHOUT RESIDUAL DEFICITS: ICD-10-CM

## 2024-05-28 DIAGNOSIS — Z12.11 COLON CANCER SCREENING: ICD-10-CM

## 2024-05-28 DIAGNOSIS — I10 ESSENTIAL HYPERTENSION: ICD-10-CM

## 2024-05-28 DIAGNOSIS — E78.5 HYPERLIPIDEMIA, UNSPECIFIED HYPERLIPIDEMIA TYPE: ICD-10-CM

## 2024-05-28 DIAGNOSIS — Z00.00 MEDICARE ANNUAL WELLNESS VISIT, SUBSEQUENT: Primary | ICD-10-CM

## 2024-05-28 DIAGNOSIS — E11.65 TYPE 2 DIABETES MELLITUS WITH HYPERGLYCEMIA, WITHOUT LONG-TERM CURRENT USE OF INSULIN (HCC): ICD-10-CM

## 2024-05-28 PROBLEM — I16.0 HYPERTENSIVE URGENCY: Status: RESOLVED | Noted: 2022-09-08 | Resolved: 2024-05-28

## 2024-05-28 PROBLEM — H25.13 AGE-RELATED NUCLEAR CATARACT OF BOTH EYES: Status: RESOLVED | Noted: 2023-04-25 | Resolved: 2024-05-28

## 2024-05-28 PROCEDURE — 1126F AMNT PAIN NOTED NONE PRSNT: CPT | Performed by: FAMILY MEDICINE

## 2024-05-28 PROCEDURE — 3075F SYST BP GE 130 - 139MM HG: CPT | Performed by: FAMILY MEDICINE

## 2024-05-28 PROCEDURE — 3078F DIAST BP <80 MM HG: CPT | Performed by: FAMILY MEDICINE

## 2024-05-28 PROCEDURE — 96160 PT-FOCUSED HLTH RISK ASSMT: CPT | Performed by: FAMILY MEDICINE

## 2024-05-28 PROCEDURE — G0439 PPPS, SUBSEQ VISIT: HCPCS | Performed by: FAMILY MEDICINE

## 2024-05-28 PROCEDURE — 1160F RVW MEDS BY RX/DR IN RCRD: CPT | Performed by: FAMILY MEDICINE

## 2024-05-28 PROCEDURE — 3008F BODY MASS INDEX DOCD: CPT | Performed by: FAMILY MEDICINE

## 2024-05-28 PROCEDURE — 1159F MED LIST DOCD IN RCRD: CPT | Performed by: FAMILY MEDICINE

## 2024-05-28 PROCEDURE — 1170F FXNL STATUS ASSESSED: CPT | Performed by: FAMILY MEDICINE

## 2024-05-28 RX ORDER — BLOOD SUGAR DIAGNOSTIC
STRIP MISCELLANEOUS
COMMUNITY
Start: 2024-02-27

## 2024-05-28 NOTE — PATIENT INSTRUCTIONS
Lawrence Caban's SCREENING SCHEDULE   Tests on this list are recommended by your physician but may not be covered, or covered at this frequency, by your insurer.   Please check with your insurance carrier before scheduling to verify coverage.   PREVENTATIVE SERVICES FREQUENCY &  COVERAGE DETAILS LAST COMPLETION DATE   Diabetes Screening    Fasting Blood Sugar / Glucose    One screening every 12 months if never tested or if previously tested but not diagnosed with pre-diabetes   One screening every 6 months if diagnosed with pre-diabetes Lab Results   Component Value Date     (H) 06/03/2023        Cardiovascular Disease Screening    Lipid Panel  Cholesterol  Lipoprotein (HDL)  Triglycerides Covered every 5 years for all Medicare beneficiaries without apparent signs or symptoms of cardiovascular disease Lab Results   Component Value Date    CHOLEST 143 06/03/2023    HDL 56 06/03/2023    LDL 73 06/03/2023    TRIG 67 06/03/2023         Electrocardiogram (EKG)   Covered if needed at Welcome to Medicare, and non-screening if indicated for medical reasons 09/08/2022      Ultrasound Screening for Abdominal Aortic Aneurysm (AAA) Covered once in a lifetime for one of the following risk factors   • Men who are 65-75 years old and have ever smoked   • Anyone with a family history -     Colorectal Cancer Screening  Covered for ages 50-85; only need ONE of the following:    Colonoscopy   Covered every 10 years    Covered every 2 years if patient is at high risk or previous colonoscopy was abnormal -    Health Maintenance   Topic Date Due   • Colorectal Cancer Screening  Never done       Flexible Sigmoidoscopy   Covered every 4 years -    Fecal Occult Blood Test Covered annually -   Prostate Cancer Screening    Prostate-Specific Antigen (PSA) Annually Lab Results   Component Value Date    PSA 0.91 06/03/2023     Health Maintenance   Topic Date Due   • PSA  06/03/2025      Immunizations    Influenza Covered once per flu  season  Please get every year -  No recommendations at this time    Pneumococcal Each vaccine ([id number] & [id number]) covered once after 65 Prevnar 13: -    [id number]: -     Pneumococcal Vaccination(1 of 2 - PCV) Never done    Hepatitis B One screening covered for patients with certain risk factors   -  No recommendations at this time    Tetanus Toxoid Not covered by Medicare Part B unless medically necessary (cut with metal); may be covered with your pharmacy prescription benefits -    Tetanus, Diptheria and Pertusis TD and TDaP Not covered by Medicare Part B -  No recommendations at this time    Zoster Not covered by Medicare Part B; may be covered with your pharmacy  prescription benefits -  Zoster Vaccines(1 of 2) Never done     Diabetes      Hemoglobin A1C Annually; if last result is elevated, may be asked to retest more frequently.    Medicare covers every 3 months Lab Results   Component Value Date     (H) 06/03/2023    A1C 6.2 (H) 06/03/2023       Hemoglobin A1C Test due on 12/03/2023    Creat/alb ratio Annually Lab Results   Component Value Date    MICROALBCREA 30.2 (H) 06/03/2023       LDL Annually Lab Results   Component Value Date    LDL 73 06/03/2023       Dilated Eye Exam Annually Last Diabetic Eye Exam:  Last Dilated Eye Exam: 04/23/24  Eye Exam shows Diabetic Retinopathy?: No       Annual Monitoring of Persistent Medications (ACE/ARB, digoxin diuretics, anticonvulsants)    Potassium Annually Lab Results   Component Value Date    K 4.6 06/03/2023         Creatinine   Annually Lab Results   Component Value Date    CREATSERUM 0.82 06/03/2023         BUN Annually Lab Results   Component Value Date    BUN 13 06/03/2023       Drug Serum Conc Annually No results found for: \"DIGOXIN\", \"DIG\", \"VALP\"

## 2024-05-28 NOTE — PROGRESS NOTES
Subjective:   Lawrence Caban is a 69 year old male who presents for a Medicare Subsequent Annual Wellness visit (Pt already had Initial Annual Wellness) and scheduled follow up of multiple significant but stable problems.   Patient is here for routine physical exam and medicare annual check up. No acute issues. Chronic active problems were noted and discussed. Diet and activity have been fair.  Past medical history, family history, and social history were reviewed. Medicare screening questions taken by nurse reviewed.      History/Other:   Fall Risk Assessment:   He has been screened for Falls and is low risk.      Cognitive Assessment:   He had a completely normal cognitive assessment - see flowsheet entries     Functional Ability/Status:   Lawrence Caban has some abnormal functions as listed below:  He has Hearing problems based on screening of functional status.      Depression Screening (PHQ-2/PHQ-9): PHQ-2 SCORE: 0  , done 5/28/2024   Last [place] Suicide Screening on 5/28/2024 was No Risk.          Advanced Directives:   He does NOT have a Living Will. [Do you have a living will?: No]  He does NOT have a Power of  for Health Care. [Do you have a healthcare power of ?: No]  Not discussed      Patient Active Problem List   Diagnosis   • Dyslipidemia   • Hypertension   • Acute CVA (cerebrovascular accident) (HCC)   • Diabetes mellitus type 2 without retinopathy (HCC)     Allergies:  He has No Known Allergies.    Current Medications:  Outpatient Medications Marked as Taking for the 5/28/24 encounter (Office Visit) with Ney Luna MD   Medication Sig   • Glucose Blood (ACCU-CHEK GUIDE) In Vitro Strip    • amLODIPine 5 MG Oral Tab Take 1 tablet (5 mg total) by mouth daily.   • Blood Glucose Monitoring Suppl Supplies Does not apply Misc Use as directed to check blood glucose 2 times daily (fasting and 2 hours after biggest meal)   • Accu-Chek Softclix Lancets Does not apply Misc Please check  blood sugars twice daily   • atorvastatin 80 MG Oral Tab Take 1 tablet (80 mg total) by mouth nightly.   • metFORMIN HCl 1000 MG Oral Tab Take 1 tablet (1,000 mg total) by mouth 2 (two) times daily with meals.   • lisinopril 30 MG Oral Tab Take 1 tablet (30 mg total) by mouth daily.   • Blood Glucose Monitoring Suppl Does not apply Kit Use as directed to check blood glucose 2 times daily (fasting and 2 hours after biggest meal)   • aspirin 81 MG Oral Tab EC Take 1 tablet (81 mg total) by mouth daily.   • Blood Glucose Monitoring Suppl Does not apply Misc Use as directed to check blood glucose 2 times daily (fasting and 2 hours after biggest meal)       Medical History:  He  has a past medical history of Diabetes (HCC).  Surgical History:  He  has no past surgical history on file.   Family History:  His family history includes Diabetes in his mother; Heart Disorder in his father and mother.  Social History:  He  reports that he has never smoked. He has never used smokeless tobacco. He reports current alcohol use. He reports that he does not use drugs.    Tobacco:  He has never smoked tobacco.    CAGE Alcohol Screen:   CAGE screening score of 0 on 5/28/2024, showing low risk of alcohol abuse.      Patient Care Team:  Ney Luna MD as PCP - General (Family Medicine)    Review of Systems   Constitutional: Negative.  Negative for fever.   HENT: Negative.     Eyes: Negative.    Respiratory: Negative.  Negative for shortness of breath.    Cardiovascular: Negative.  Negative for chest pain.   Gastrointestinal: Negative.  Negative for abdominal pain and blood in stool.   Endocrine: Negative.    Genitourinary: Negative.  Negative for difficulty urinating.   Musculoskeletal:  Positive for arthralgias.   Skin: Negative.    Allergic/Immunologic: Negative.    Neurological: Negative.  Negative for dizziness, light-headedness and headaches.   Hematological: Negative.    Psychiatric/Behavioral: Negative.  Negative for dysphoric  mood. The patient is not nervous/anxious.           Objective:   Physical Exam  Constitutional:       Appearance: Normal appearance. He is well-developed.   HENT:      Head: Normocephalic.      Right Ear: Tympanic membrane, ear canal and external ear normal.      Left Ear: Tympanic membrane, ear canal and external ear normal.      Nose: Nose normal.      Mouth/Throat:      Mouth: Mucous membranes are moist.      Pharynx: No oropharyngeal exudate or posterior oropharyngeal erythema.   Eyes:      Conjunctiva/sclera: Conjunctivae normal.   Cardiovascular:      Rate and Rhythm: Normal rate and regular rhythm.      Pulses: Normal pulses.      Heart sounds: Normal heart sounds.   Pulmonary:      Effort: Pulmonary effort is normal. No respiratory distress.      Breath sounds: Normal breath sounds. No wheezing or rales.   Abdominal:      General: Abdomen is flat. There is no distension.      Palpations: Abdomen is soft. There is no mass.      Tenderness: There is no abdominal tenderness.   Musculoskeletal:         General: Normal range of motion.      Cervical back: Normal range of motion and neck supple.   Skin:     General: Skin is warm.   Neurological:      General: No focal deficit present.      Mental Status: He is alert and oriented to person, place, and time.      Sensory: No sensory deficit.      Deep Tendon Reflexes: Reflexes are normal and symmetric. Reflexes normal.   Psychiatric:         Mood and Affect: Mood normal.         Behavior: Behavior normal.        /76   Pulse 73   Temp 98.4 °F (36.9 °C) (Oral)   Ht 6' (1.829 m)   Wt 187 lb (84.8 kg)   SpO2 93%   BMI 25.36 kg/m²  Estimated body mass index is 25.36 kg/m² as calculated from the following:    Height as of this encounter: 6' (1.829 m).    Weight as of this encounter: 187 lb (84.8 kg).    Medicare Hearing Assessment:   Hearing Screening    Screening Method: Finger Rub  Finger Rub Result: Pass               Assessment & Plan:   Lawrence Caban is  a 69 year old male who presents for a Medicare Assessment.     1. Medicare annual wellness visit, subsequent:  - Exam is unremarkable. Screening tests were discussed, and after discussion, will check lab work as below. Healthy diet, exercise, and weight were discussed. To call if problems and follow up and further management after testing. Routine follow up.    2. Type 2 diabetes mellitus with hyperglycemia, without long-term current use of insulin:  - Will check DM labs and follow up and further management after lab work; Medications reviewed and renewed; Will continue present medications and follow up with endocrine; To call if problems and monitor blood sugars; Routine follow up in 6 months; Encouraged DM eye exam.     -     Hemoglobin A1C  -     Microalb/Creat Ratio, Random Urine  -     CBC, Platelet; No Differential    3. Essential hypertension:  - Stable, Will check lab work as below; Medication reviewed. Follow up and further management after testing. To monitor blood pressure; To call if any persistent elevation of blood pressure; Discussed good diet/activity; Routine follow up in 6-12 months or as needed.     4. Hyperlipidemia, unspecified hyperlipidemia type:  - Stable; No side effects with medication; To call if problems. Will check lipid panel. Follow up and further management after labs. Continue good diet and activity. Routine follow up in 6-12 months.    -     Comp Metabolic Panel (14)  -     Lipid Panel    5. Colon cancer screening:  - After discussion, FIT testing was ordered, Follow up and further management after testing    -     Occult Blood, Fecal, FIT Immunoassay    6. Screening PSA (prostate specific antigen):  - After discussion with patient, pt would like to check PSA blood testing; Follow up and further management after testing    -     PSA Total, Diagnostic    7. History of CVA without deficits:  - After discussion with patient, to monitor for symptoms and call if any significant symptoms;  continue treatment with aspirin  The patient indicates understanding of these issues and agrees to the plan.  Reinforced healthy diet, lifestyle, and exercise.      No follow-ups on file.     Ney Luna MD, 5/28/2024     Supplementary Documentation:   General Health:  In the past six months, have you lost more than 10 pounds without trying?: 2 - No  Has your appetite been poor?: No  Type of Diet: Balanced  How does the patient maintain a good energy level?: Daily Walks  How would you describe your daily physical activity?: Light  How would you describe your current health state?: Fair  How do you maintain positive mental well-being?: Social Interaction;Visiting Family  On a scale of 0 to 10, with 0 being no pain and 10 being severe pain, what is your pain level?: 2 - (Mild)  In the past six months, have you experienced urine leakage?: 1-Yes  At any time do you feel concerned for the safety/well-being of yourself and/or your children, in your home or elsewhere?: No  Have you had any immunizations at another office such as Influenza, Hepatitis B, Tetanus, or Pneumococcal?: No        Lawrence Caban's SCREENING SCHEDULE   Tests on this list are recommended by your physician but may not be covered, or covered at this frequency, by your insurer.   Please check with your insurance carrier before scheduling to verify coverage.   PREVENTATIVE SERVICES FREQUENCY &  COVERAGE DETAILS LAST COMPLETION DATE   Diabetes Screening    Fasting Blood Sugar / Glucose    One screening every 12 months if never tested or if previously tested but not diagnosed with pre-diabetes   One screening every 6 months if diagnosed with pre-diabetes Lab Results   Component Value Date     (H) 06/03/2023        Cardiovascular Disease Screening    Lipid Panel  Cholesterol  Lipoprotein (HDL)  Triglycerides Covered every 5 years for all Medicare beneficiaries without apparent signs or symptoms of cardiovascular disease Lab Results   Component  Value Date    CHOLEST 143 06/03/2023    HDL 56 06/03/2023    LDL 73 06/03/2023    TRIG 67 06/03/2023         Electrocardiogram (EKG)   Covered if needed at Welcome to Medicare, and non-screening if indicated for medical reasons 09/08/2022      Ultrasound Screening for Abdominal Aortic Aneurysm (AAA) Covered once in a lifetime for one of the following risk factors   • Men who are 65-75 years old and have ever smoked   • Anyone with a family history -     Colorectal Cancer Screening  Covered for ages 50-85; only need ONE of the following:    Colonoscopy   Covered every 10 years    Covered every 2 years if patient is at high risk or previous colonoscopy was abnormal -    Health Maintenance   Topic Date Due   • Colorectal Cancer Screening  Never done       Flexible Sigmoidoscopy   Covered every 4 years -    Fecal Occult Blood Test Covered annually -   Prostate Cancer Screening    Prostate-Specific Antigen (PSA) Annually Lab Results   Component Value Date    PSA 0.91 06/03/2023     Health Maintenance   Topic Date Due   • PSA  06/03/2025      Immunizations    Influenza Covered once per flu season  Please get every year -  No recommendations at this time    Pneumococcal Each vaccine ([id number] & [id number]) covered once after 65 Prevnar 13: -    [id number]: -     Pneumococcal Vaccination(1 of 2 - PCV) Never done    Hepatitis B One screening covered for patients with certain risk factors   -  No recommendations at this time    Tetanus Toxoid Not covered by Medicare Part B unless medically necessary (cut with metal); may be covered with your pharmacy prescription benefits -    Tetanus, Diptheria and Pertusis TD and TDaP Not covered by Medicare Part B -  No recommendations at this time    Zoster Not covered by Medicare Part B; may be covered with your pharmacy  prescription benefits -  Zoster Vaccines(1 of 2) Never done     Diabetes      Hemoglobin A1C Annually; if last result is elevated, may be asked to retest more  frequently.    Medicare covers every 3 months Lab Results   Component Value Date     (H) 06/03/2023    A1C 6.2 (H) 06/03/2023       Hemoglobin A1C Test due on 12/03/2023    Creat/alb ratio Annually Lab Results   Component Value Date    MICROALBCREA 30.2 (H) 06/03/2023       LDL Annually Lab Results   Component Value Date    LDL 73 06/03/2023       Dilated Eye Exam Annually Last Diabetic Eye Exam:  Last Dilated Eye Exam: 04/23/24  Eye Exam shows Diabetic Retinopathy?: No       Annual Monitoring of Persistent Medications (ACE/ARB, digoxin diuretics, anticonvulsants)    Potassium Annually Lab Results   Component Value Date    K 4.6 06/03/2023         Creatinine   Annually Lab Results   Component Value Date    CREATSERUM 0.82 06/03/2023         BUN Annually Lab Results   Component Value Date    BUN 13 06/03/2023       Drug Serum Conc Annually No results found for: \"DIGOXIN\", \"DIG\", \"VALP\"

## 2024-06-01 ENCOUNTER — LAB ENCOUNTER (OUTPATIENT)
Dept: LAB | Age: 69
End: 2024-06-01
Attending: FAMILY MEDICINE
Payer: MEDICARE

## 2024-06-01 LAB
ALBUMIN SERPL-MCNC: 4.9 G/DL (ref 3.2–4.8)
ALBUMIN/GLOB SERPL: 1.7 {RATIO} (ref 1–2)
ALP LIVER SERPL-CCNC: 52 U/L
ALT SERPL-CCNC: 32 U/L
ANION GAP SERPL CALC-SCNC: 5 MMOL/L (ref 0–18)
AST SERPL-CCNC: 31 U/L (ref ?–34)
BILIRUB SERPL-MCNC: 0.5 MG/DL (ref 0.2–1.1)
BUN BLD-MCNC: 14 MG/DL (ref 9–23)
BUN/CREAT SERPL: 16.1 (ref 10–20)
CALCIUM BLD-MCNC: 9.6 MG/DL (ref 8.7–10.4)
CHLORIDE SERPL-SCNC: 108 MMOL/L (ref 98–112)
CHOLEST SERPL-MCNC: 164 MG/DL (ref ?–200)
CO2 SERPL-SCNC: 29 MMOL/L (ref 21–32)
CREAT BLD-MCNC: 0.87 MG/DL
CREAT UR-SCNC: 67.5 MG/DL
DEPRECATED RDW RBC AUTO: 41.3 FL (ref 35.1–46.3)
EGFRCR SERPLBLD CKD-EPI 2021: 95 ML/MIN/1.73M2 (ref 60–?)
ERYTHROCYTE [DISTWIDTH] IN BLOOD BY AUTOMATED COUNT: 11.8 % (ref 11–15)
EST. AVERAGE GLUCOSE BLD GHB EST-MCNC: 134 MG/DL (ref 68–126)
FASTING PATIENT LIPID ANSWER: YES
FASTING STATUS PATIENT QL REPORTED: YES
GLOBULIN PLAS-MCNC: 2.9 G/DL (ref 2–3.5)
GLUCOSE BLD-MCNC: 139 MG/DL (ref 70–99)
HBA1C MFR BLD: 6.3 % (ref ?–5.7)
HCT VFR BLD AUTO: 45.1 %
HDLC SERPL-MCNC: 59 MG/DL (ref 40–59)
HGB BLD-MCNC: 15 G/DL
LDLC SERPL CALC-MCNC: 88 MG/DL (ref ?–100)
MCH RBC QN AUTO: 31.9 PG (ref 26–34)
MCHC RBC AUTO-ENTMCNC: 33.3 G/DL (ref 31–37)
MCV RBC AUTO: 96 FL
MICROALBUMIN UR-MCNC: 0.6 MG/DL
MICROALBUMIN/CREAT 24H UR-RTO: 8.9 UG/MG (ref ?–30)
NONHDLC SERPL-MCNC: 105 MG/DL (ref ?–130)
OSMOLALITY SERPL CALC.SUM OF ELEC: 297 MOSM/KG (ref 275–295)
PLATELET # BLD AUTO: 265 10(3)UL (ref 150–450)
POTASSIUM SERPL-SCNC: 4.6 MMOL/L (ref 3.5–5.1)
PROT SERPL-MCNC: 7.8 G/DL (ref 5.7–8.2)
PSA SERPL-MCNC: 0.84 NG/ML (ref ?–4)
RBC # BLD AUTO: 4.7 X10(6)UL
SODIUM SERPL-SCNC: 142 MMOL/L (ref 136–145)
TRIGL SERPL-MCNC: 92 MG/DL (ref 30–149)
VLDLC SERPL CALC-MCNC: 15 MG/DL (ref 0–30)
WBC # BLD AUTO: 6.3 X10(3) UL (ref 4–11)

## 2024-06-01 PROCEDURE — 36415 COLL VENOUS BLD VENIPUNCTURE: CPT | Performed by: FAMILY MEDICINE

## 2024-06-01 PROCEDURE — 80061 LIPID PANEL: CPT | Performed by: FAMILY MEDICINE

## 2024-06-01 PROCEDURE — 82043 UR ALBUMIN QUANTITATIVE: CPT | Performed by: FAMILY MEDICINE

## 2024-06-01 PROCEDURE — 84153 ASSAY OF PSA TOTAL: CPT | Performed by: FAMILY MEDICINE

## 2024-06-01 PROCEDURE — 80053 COMPREHEN METABOLIC PANEL: CPT | Performed by: FAMILY MEDICINE

## 2024-06-01 PROCEDURE — 82570 ASSAY OF URINE CREATININE: CPT | Performed by: FAMILY MEDICINE

## 2024-06-01 PROCEDURE — 85027 COMPLETE CBC AUTOMATED: CPT | Performed by: FAMILY MEDICINE

## 2024-06-01 PROCEDURE — 83036 HEMOGLOBIN GLYCOSYLATED A1C: CPT | Performed by: FAMILY MEDICINE

## 2024-06-02 DIAGNOSIS — E11.65 TYPE 2 DIABETES MELLITUS WITH HYPERGLYCEMIA, WITHOUT LONG-TERM CURRENT USE OF INSULIN (HCC): ICD-10-CM

## 2024-06-03 NOTE — TELEPHONE ENCOUNTER
Endocrine Refill protocol for Glucose testing supplies       Appointment with Endocrinology completed in the last 12 months or scheduled in the next 6 months     Verify appointment has been completed or scheduled in the appropriate timeline. If so can send a 90 day supply with 1 refill.        Last completed office visit: 06/14/23  Next scheduled Follow up: no future appt mcm sent

## 2024-06-06 RX ORDER — BLOOD SUGAR DIAGNOSTIC
1 STRIP MISCELLANEOUS 2 TIMES DAILY
Qty: 200 STRIP | Refills: 0 | Status: SHIPPED | OUTPATIENT
Start: 2024-06-06

## 2024-06-06 RX ORDER — LANCETS
EACH MISCELLANEOUS
Qty: 200 EACH | Refills: 1 | Status: SHIPPED | OUTPATIENT
Start: 2024-06-06

## 2024-07-28 DIAGNOSIS — E11.65 TYPE 2 DIABETES MELLITUS WITH HYPERGLYCEMIA, WITHOUT LONG-TERM CURRENT USE OF INSULIN (HCC): ICD-10-CM

## 2024-07-29 NOTE — TELEPHONE ENCOUNTER
Dr Jeronimo-  Called and LVM asking pt to call back to schedule appt.   Please approve pended medication if appropriate    Endocrine Refill protocol for metformin    Protocol Criteria: FAILED for Metformin    -Appointment with Endocrinology completed in the last 6 months or scheduled in the next 3 months    -GFR greater than or equal to 40 in the past 12 months     -A1c result below 8.5% in the past 6 months      Verify the above has been completed or scheduled in the appropriate timeline. If so can send a 90 day supply with 1 refill.     Last completed office visit: 6/14/23  Next scheduled Follow up:   Future Appointments   Date Time Provider Department Center   4/29/2025  4:30 PM Colt Hill MD [facility]       Last GFR result:       Last A1c result:  Lab Results   Component Value Date     (H) 06/01/2024    A1C 6.3 (H) 06/01/2024

## 2024-08-01 DIAGNOSIS — E11.65 TYPE 2 DIABETES MELLITUS WITH HYPERGLYCEMIA, WITHOUT LONG-TERM CURRENT USE OF INSULIN (HCC): ICD-10-CM

## 2024-08-02 NOTE — TELEPHONE ENCOUNTER
Dr. Jeronimo, Criteria failed for Metformin, last appointment was 6/14/23.  Order is pended, please review and send if appropriate. Thank you.

## 2024-08-02 NOTE — TELEPHONE ENCOUNTER
Endocrine Refill protocol for metformin    Protocol Criteria:pass    -Appointment with Endocrinology completed in the last 6 months or scheduled in the next 3 months    -GFR greater than or equal to 40 in the past 12 months     -A1c result below 8.5% in the past 6 months      Verify the above has been completed or scheduled in the appropriate timeline. If so can send a 90 day supply with 1 refill.     Last completed office visit:06/14/23  Next scheduled Follow up: no future appt mcm sent      Last GFR result: 95      Last A1c result:6.3

## 2024-08-19 ENCOUNTER — TELEPHONE (OUTPATIENT)
Dept: FAMILY MEDICINE CLINIC | Facility: CLINIC | Age: 69
End: 2024-08-19

## 2024-09-07 RX ORDER — LISINOPRIL 30 MG/1
30 TABLET ORAL DAILY
Qty: 90 TABLET | Refills: 3 | Status: SHIPPED | OUTPATIENT
Start: 2024-09-07

## 2024-09-07 NOTE — TELEPHONE ENCOUNTER
Refill Per Protocol     Requested Prescriptions   Pending Prescriptions Disp Refills    lisinopril 30 MG Oral Tab 90 tablet 3     Sig: Take 1 tablet (30 mg total) by mouth daily.       Hypertension Medications Protocol Passed - 9/4/2024  4:12 PM        Passed - CMP or BMP in past 12 months        Passed - Last BP reading less than 140/90     BP Readings from Last 1 Encounters:   05/28/24 136/76               Passed - In person appointment or virtual visit in the past 12 mos or appointment in next 3 mos     Recent Outpatient Visits              3 months ago [facility]    4 months ago Diabetes mellitus type 2 without retinopathy [facility]    9 months ago [facility]ential hypertension    [facility]    [facility]    1 year ago Type 2 diabetes mellitus with hyperglycemia, without long-term current use of insulin ([facility])    [facility] Shira Jeronimo MD    Office Visit    1 year ago [facility]pertension, unspecified type    [facility]          Future Appointments         Provider Department Appt Notes    In 4 weeks Shira Jeronimo MD [facility] Blood glucose                    Passed - EGFRCR or GFRNAA > 50     GFR Evaluation  EGFRCR: 95 , resulted on 6/1/2024                 Future Appointments         Provider Department Appt Notes    In 4 weeks Shira Jeronimo MD [facility] Blood glucose          Recent Outpatient Visits              3 months ago Medicare annual wellness visit, subsequent    [facility],  [facility] Ney Gomez MD    Office Visit    4 months ago Diabetes mellitus type 2 without retinopathy ([facility])    [facility] [place]    Office Visit    9 months ago Essential hypertension    [facility], [facility], Ney Gomez MD    Office Visit    1 year ago Type 2 diabetes mellitus with hyperglycemia, without long-term current use of insulin ([facility])    [facility] [place]    Office Visit    1 year ago Hypertension, unspecified type    [facility], Ney Gomez MD    Office Visit

## 2024-10-05 ENCOUNTER — OFFICE VISIT (OUTPATIENT)
Dept: ENDOCRINOLOGY CLINIC | Facility: CLINIC | Age: 69
End: 2024-10-05

## 2024-10-05 VITALS
HEIGHT: 72 IN | WEIGHT: 188.63 LBS | DIASTOLIC BLOOD PRESSURE: 75 MMHG | SYSTOLIC BLOOD PRESSURE: 145 MMHG | BODY MASS INDEX: 25.55 KG/M2 | HEART RATE: 69 BPM

## 2024-10-05 DIAGNOSIS — E11.65 TYPE 2 DIABETES MELLITUS WITH HYPERGLYCEMIA, WITHOUT LONG-TERM CURRENT USE OF INSULIN (HCC): Primary | ICD-10-CM

## 2024-10-05 DIAGNOSIS — E78.5 DYSLIPIDEMIA: ICD-10-CM

## 2024-10-05 LAB
CARTRIDGE EXPIRATION DATE: ABNORMAL DATE
GLUCOSE BLOOD: 143
HEMOGLOBIN A1C: 6.1 % (ref 4.3–5.6)
TEST STRIP EXPIRATION DATE: NORMAL DATE
TEST STRIP LOT #: NORMAL NUMERIC

## 2024-10-05 PROCEDURE — 3077F SYST BP >= 140 MM HG: CPT | Performed by: INTERNAL MEDICINE

## 2024-10-05 PROCEDURE — 3078F DIAST BP <80 MM HG: CPT | Performed by: INTERNAL MEDICINE

## 2024-10-05 PROCEDURE — 99214 OFFICE O/P EST MOD 30 MIN: CPT | Performed by: INTERNAL MEDICINE

## 2024-10-05 PROCEDURE — 82947 ASSAY GLUCOSE BLOOD QUANT: CPT | Performed by: INTERNAL MEDICINE

## 2024-10-05 PROCEDURE — 83036 HEMOGLOBIN GLYCOSYLATED A1C: CPT | Performed by: INTERNAL MEDICINE

## 2024-10-05 PROCEDURE — 3008F BODY MASS INDEX DOCD: CPT | Performed by: INTERNAL MEDICINE

## 2024-10-05 PROCEDURE — 3061F NEG MICROALBUMINURIA REV: CPT | Performed by: INTERNAL MEDICINE

## 2024-10-05 PROCEDURE — 3044F HG A1C LEVEL LT 7.0%: CPT | Performed by: INTERNAL MEDICINE

## 2024-10-05 RX ORDER — BLOOD SUGAR DIAGNOSTIC
1 STRIP MISCELLANEOUS 2 TIMES DAILY
Qty: 200 STRIP | Refills: 0 | Status: SHIPPED | OUTPATIENT
Start: 2024-10-05

## 2024-10-05 RX ORDER — LANCETS
EACH MISCELLANEOUS
Qty: 200 EACH | Refills: 1 | Status: SHIPPED | OUTPATIENT
Start: 2024-10-05

## 2024-10-05 RX ORDER — BLOOD SUGAR DIAGNOSTIC
1 STRIP MISCELLANEOUS 2 TIMES DAILY
Qty: 200 STRIP | Refills: 1 | Status: CANCELLED | OUTPATIENT
Start: 2024-10-05

## 2024-10-05 NOTE — PROGRESS NOTES
Name: Lawrence Caban  Date: 10/05/24    Referring Physician: No ref. provider found    HISTORY OF PRESENT ILLNESS   Lawrence Caban is a 69 year old male who presents for evaluation and management of type 2 diabetes. He was diagnosed with diabetes about a few years ago.     Blood Glucose Today: 143  HbA1C or glycohemoglobin 6.1 today (and it was 6.2 on 6/03/23 (and it was 9.9 on 5/04/22)  Type 1 or Type 2?: Type 2  Medications for DM: Metformin 1g PO bid   Checking 1-2 times per day  Fasting- 120-130  2-hours after eating- 100-140  Episodes of hypoglycemia: none    Dietary compliance: good  Breakfast: cereal, whole wheat bread  Lunch: cold cuts sandwich with whole wheat bread, with lettuce  Dinner: shrimp, rice a timur, brussels sprouts    Exercise: not as much    Polyuria/polydipsia: none    Blurred vision: none    Flu Vaccine This Season: none    Covid Vaccine: yes    REVIEW OF SYSTEMS  CV: Cardiovascular disease present: CVA in 9/2022         Hypertension present: not at goal, not on meds         Hyperlipidemia present: at goal, on meds (6/01/24)         Peripheral Vascular Disease present: none    : Nephropathy present: MAC: 30.2 (6/01/24); Creatinine: 0.87     Neuro: Neuropathy present: none    Eyes: Diabetic retinopathy present: none            Most recent visit to eye doctor in last 12 months: up to date    Skin: Infection or ulceration: none    Osteoporosis/ Osteopenia: none Vitamin D: unknown    Thyroid disease: none TSH: unknown    Medications:     Current Outpatient Medications:     metFORMIN HCl 1000 MG Oral Tab, Take 1 tablet (1,000 mg total) by mouth 2 (two) times daily with meals., Disp: 180 tablet, Rfl: 1    lisinopril 30 MG Oral Tab, Take 1 tablet (30 mg total) by mouth daily., Disp: 90 tablet, Rfl: 3    ACCU-CHEK GUIDE In Vitro Strip, TEST TWICE DAILY, Disp: 200 strip, Rfl: 0    Accu-Chek Softclix Lancets Does not apply Misc, TEST TWICE DAILY, Disp: 200 each, Rfl: 1    amLODIPine 5 MG Oral Tab,  Take 1 tablet (5 mg total) by mouth daily., Disp: 90 tablet, Rfl: 3    Blood Glucose Monitoring Suppl Supplies Does not apply Misc, Use as directed to check blood glucose 2 times daily (fasting and 2 hours after biggest meal), Disp: 200 each, Rfl: 1    atorvastatin 80 MG Oral Tab, Take 1 tablet (80 mg total) by mouth nightly., Disp: 90 tablet, Rfl: 3    Blood Glucose Monitoring Suppl Does not apply Kit, Use as directed to check blood glucose 2 times daily (fasting and 2 hours after biggest meal), Disp: 1 kit, Rfl: 0    aspirin 81 MG Oral Tab EC, Take 1 tablet (81 mg total) by mouth daily., Disp: , Rfl: 0    Blood Glucose Monitoring Suppl Does not apply Misc, Use as directed to check blood glucose 2 times daily (fasting and 2 hours after biggest meal), Disp: 200 each, Rfl: 0     Allergies:   No Known Allergies    Social History:   Social History     Socioeconomic History    Marital status:    Tobacco Use    Smoking status: Never    Smokeless tobacco: Never   Vaping Use    Vaping status: Never Used   Substance and Sexual Activity    Alcohol use: Yes    Drug use: Never       Medical History:   Past Medical History:    Diabetes (HCC)       Surgical history:   No past surgical history on file.      PHYSICAL EXAM  Vitals:    10/05/24 0913   BP: 145/75   Pulse: 69   Weight: 188 lb 9.6 oz (85.5 kg)   Height: 6' (1.829 m)       General Appearance:  alert, well developed, in no acute distress  Eyes:  normal conjunctivae, sclera., normal sclera and normal pupils  Neuro:  sensory grossly intact and motor grossly intact, normal monofilament exam bilaterally, pulses felt  Psychiatric:  oriented to time, self, and place  Nutritional:  no abnormal weight gain or loss  Bilateral barefoot skin diabetic exam is normal, visualized feet and the appearance is normal.  Bilateral monofilament/sensation of both feet is normal.  Pulsation pedal pulse exam of both lower legs/feet is normal as well.          Lab Data:   Lab Results    Component Value Date     (H) 06/01/2024    A1C 6.3 (H) 06/01/2024     Lab Results   Component Value Date     (H) 06/01/2024    BUN 14 06/01/2024    BUNCREA 16.1 06/01/2024    CREATSERUM 0.87 06/01/2024    ANIONGAP 5 06/01/2024    GFRNAA 93 02/18/2022    GFRAA 107 02/18/2022    CA 9.6 06/01/2024    OSMOCALC 297 (H) 06/01/2024    ALKPHO 52 06/01/2024    AST 31 06/01/2024    ALT 32 06/01/2024    BILT 0.5 06/01/2024    TP 7.8 06/01/2024    ALB 4.9 (H) 06/01/2024    GLOBULIN 2.9 06/01/2024     06/01/2024    K 4.6 06/01/2024     06/01/2024    CO2 29.0 06/01/2024     Lab Results   Component Value Date    CHOLEST 164 06/01/2024    TRIG 92 06/01/2024    HDL 59 06/01/2024    LDL 88 06/01/2024    VLDL 15 06/01/2024    NONHDLC 105 06/01/2024     Lab Results   Component Value Date    MALBP 0.60 06/01/2024    CREUR 67.50 06/01/2024         ASSESSMENT/PLAN:  This is a 69 year-old man here for evaluation and management of newly-diagnosed type 2 diabetes. We discussed the ABCs of DM.     1.) Hyperglycemia Management- We discussed the importance of glycemic control to prevent complications of diabetes. We discussed the importance of SBGM. I offered and provided patient education materials and offered a blood glucose log book.   - Continue metformin 1000mg PO bid  - Continue checking blood sugars fasting and two hours after biggest meal  - If in 6 weeks, fasting blood sugars are not improved by using the measures that we have discussed, then he will decrease the metformin to 500mg PO bid and we will discuss at the follow-up visit about adding a different medication      2.) Management of Diabetic Complications- We discussed the complications of diabetes include retinopathy, neuropathy, nephropathy and cardiovascular disease.   - Ophtho- up to date  - Flu and Covid vaccine- up to date  - BP- elevated, on meds, will monitor  - Lipids- at goal, check in 3 months  - MAC- at goal, check in 3 months  - CMP- at  goal, check in 3 months  - Neuropathy- none  - CAD- none    3.) Lifestyle Management for Diabetes- We discussed importance of a low CHO diet, and recommend 45gm per meal or 135gm per day. We discussed the importance of trying to follow a Mediterranean diet, with an emphasis on vegetables at every meal, with lots whole grains, and protein from either plant-based sources, or poultry and fish.   - Diet- gave 'the diabetes plate' handout  - Exercise- he will try and exercise more     Return to clinic in 1 year    Prior to this encounter, I spent over 15 minutes with preparing for the visit, including reviewing documents from other specialties as well as from PCP and going over test results. During the face to face encounter, I spent an additional 15 minutes which were determined for follow-up. Greater than 50% of the time was spent in counseling, anticipatory guidance, and coordination of care. Patient concerns were answered to the best of my knowledge.         10/05/24  Shira Jeronimo MD

## 2024-11-23 RX ORDER — AMLODIPINE BESYLATE 5 MG/1
5 TABLET ORAL DAILY
Qty: 90 TABLET | Refills: 3 | Status: SHIPPED | OUTPATIENT
Start: 2024-11-23

## 2024-11-23 NOTE — TELEPHONE ENCOUNTER
Message noted: Chart reviewed and may refill medication as requested. Prescription sent to listed pharmacy. Pharmacy to notify patient. Pt notified through [employer]

## 2025-01-30 ENCOUNTER — TELEPHONE (OUTPATIENT)
Dept: FAMILY MEDICINE CLINIC | Facility: CLINIC | Age: 70
End: 2025-01-30

## 2025-01-31 DIAGNOSIS — E11.65 TYPE 2 DIABETES MELLITUS WITH HYPERGLYCEMIA, WITHOUT LONG-TERM CURRENT USE OF INSULIN (HCC): ICD-10-CM

## 2025-02-01 NOTE — TELEPHONE ENCOUNTER
Endocrine Refill protocol for metformin    Protocol Criteria:  PASSED  Reason: N/A    If all below requirements are met, send a 90-day supply with 1 refill per provider protocol.     Verify appointment with Endocrinology completed in the last 6 months or scheduled in the next 3 months.  Verify A1C has been completed within the last 6 months and is below 8.5%  Verify last GFR is greater than or equal to 40 in the past 12 months    Last completed office visit:10/5/2024 Shira Jeronimo MD   Next scheduled Follow up: No future appointments.    Last GFR result:    Lab Results   Component Value Date    EGFRCR 95 06/01/2024     Last A1c result: Last A1C result: 6.1% done 10/5/2024.

## 2025-02-24 NOTE — TELEPHONE ENCOUNTER
Attempted to reach patient for: routine follow up     Outcome: line was picked up then disconnected    Next Follow Up: tomorrow     .Endocrine Refill protocol for Glucose testing supplies       Protocol Criteria: Passed  Appointment with Endocrinology completed in the last 12 months or scheduled in the next 6 months     Verify appointment has been completed or scheduled in the appropriate timeline. If so can send a 90 day supply with 1 refill.        Last completed office visit: 6/14/23 RTC in 1 year  Next scheduled Follow up: no follow up scheduled

## 2025-04-16 ENCOUNTER — TELEPHONE (OUTPATIENT)
Dept: FAMILY MEDICINE CLINIC | Facility: CLINIC | Age: 70
End: 2025-04-16

## 2025-05-28 ENCOUNTER — OFFICE VISIT (OUTPATIENT)
Dept: FAMILY MEDICINE CLINIC | Facility: CLINIC | Age: 70
End: 2025-05-28
Payer: MEDICARE

## 2025-05-28 VITALS
HEIGHT: 72 IN | RESPIRATION RATE: 16 BRPM | SYSTOLIC BLOOD PRESSURE: 136 MMHG | HEART RATE: 76 BPM | BODY MASS INDEX: 25.73 KG/M2 | TEMPERATURE: 99 F | WEIGHT: 190 LBS | DIASTOLIC BLOOD PRESSURE: 74 MMHG

## 2025-05-28 DIAGNOSIS — Z00.00 ENCOUNTER FOR ANNUAL HEALTH EXAMINATION: ICD-10-CM

## 2025-05-28 DIAGNOSIS — Z12.5 SCREENING PSA (PROSTATE SPECIFIC ANTIGEN): ICD-10-CM

## 2025-05-28 DIAGNOSIS — I10 ESSENTIAL HYPERTENSION: ICD-10-CM

## 2025-05-28 DIAGNOSIS — Z00.00 MEDICARE ANNUAL WELLNESS VISIT, SUBSEQUENT: Primary | ICD-10-CM

## 2025-05-28 DIAGNOSIS — E11.65 TYPE 2 DIABETES MELLITUS WITH HYPERGLYCEMIA, WITHOUT LONG-TERM CURRENT USE OF INSULIN (HCC): ICD-10-CM

## 2025-05-28 DIAGNOSIS — E78.5 HYPERLIPIDEMIA, UNSPECIFIED HYPERLIPIDEMIA TYPE: ICD-10-CM

## 2025-05-28 DIAGNOSIS — Z12.11 COLON CANCER SCREENING: ICD-10-CM

## 2025-05-28 DIAGNOSIS — Z86.73 HISTORY OF CVA (CEREBROVASCULAR ACCIDENT) WITHOUT RESIDUAL DEFICITS: ICD-10-CM

## 2025-05-28 PROBLEM — I63.9 ACUTE CVA (CEREBROVASCULAR ACCIDENT) (HCC): Status: RESOLVED | Noted: 2022-09-08 | Resolved: 2025-05-28

## 2025-05-28 RX ORDER — ATORVASTATIN CALCIUM 80 MG/1
80 TABLET, FILM COATED ORAL NIGHTLY
Qty: 90 TABLET | Refills: 3 | Status: SHIPPED | OUTPATIENT
Start: 2025-05-28

## 2025-05-28 NOTE — PROGRESS NOTES
Subjective:   Lawrence Caban is a 70 year old male who presents for a MA AHA (Medicare Advantage Annual Health Assessment) and Medicare Subsequent Annual Wellness visit (Pt already had Initial Annual Wellness) and scheduled follow up of multiple significant but stable problems.             Patient is here for routine physical exam and medicare annual check up. No acute issues. Chronic active problems were noted and discussed. Diet and activity have been good.   Patient is here for follow up for chronic medical issues . The patient is compliant with medications and no side effects. There are no acute issues and patient is requesting refills. The patient states medications have been helpful and effective.  Patient sees endocrine for history of diabetes. Has been doing well and stable.     Past medical history, family history, and social history were reviewed. Medicare screening questions taken by nurse reviewed.    History/Other:   Fall Risk Assessment:   He has been screened for Falls and is low risk.      Cognitive Assessment:   He had a completely normal cognitive assessment - see flowsheet entries     Functional Ability/Status:   Lawrence Caban has some abnormal functions as listed below:  He has Hearing problems based on screening of functional status.      Depression Screening (PHQ):  PHQ-2 SCORE: 0  , done 5/28/2025   Last [place] Suicide Screening on 5/28/2025 was No Risk.          Advanced Directives:   He does NOT have a Living Will. [Do you have a living will?: No]  He does NOT have a Power of  for Health Care. [Do you have a healthcare power of ?: No]  Discussed Advance Care Planning with patient (and family/surrogate if present). Standard forms made available to patient in After Visit Summary.      Problem List[1]  Allergies:  He has no known allergies.    Current Medications:  Active Meds, Sig Only[2]    Medical History:  He  has a past medical history of Diabetes ([facility]).  Surgical  History:  He  has no past surgical history on file.   Family History:  His family history includes Diabetes in his mother; Heart Disorder in his father and mother.  Social History:  He  reports that he has never smoked. He has never used smokeless tobacco. He reports current alcohol use. He reports that he does not use drugs.    Tobacco:  He has never smoked tobacco.    CAGE Alcohol Screen:   CAGE screening score of 0 on 5/28/2025, showing low risk of alcohol abuse.      Patient Care Team:  Ney Luna MD as PCP - General (Family Medicine)    Review of Systems   Constitutional: Negative.    HENT: Negative.     Eyes: Negative.    Respiratory: Negative.     Cardiovascular: Negative.    Gastrointestinal: Negative.    Endocrine: Negative.    Genitourinary: Negative.    Musculoskeletal: Negative.    Skin: Negative.    Allergic/Immunologic: Negative.    Neurological: Negative.    Hematological: Negative.    Psychiatric/Behavioral: Negative.            Objective:   Physical Exam  Constitutional:       Appearance: Normal appearance. He is well-developed.   HENT:      Head: Normocephalic.      Right Ear: Tympanic membrane, ear canal and external ear normal.      Left Ear: Tympanic membrane, ear canal and external ear normal.      Nose: Nose normal.      Mouth/Throat:      Mouth: Mucous membranes are moist.      Pharynx: No oropharyngeal exudate or posterior oropharyngeal erythema.   Eyes:      Conjunctiva/sclera: Conjunctivae normal.   Cardiovascular:      Rate and Rhythm: Normal rate and regular rhythm.      Pulses: Normal pulses.      Heart sounds: Normal heart sounds.   Pulmonary:      Effort: Pulmonary effort is normal. No respiratory distress.      Breath sounds: Normal breath sounds. No wheezing or rales.   Abdominal:      General: Abdomen is flat. There is no distension.      Palpations: Abdomen is soft. There is no mass.      Tenderness: There is no abdominal tenderness.   Musculoskeletal:         General: Normal  range of motion.      Cervical back: Normal range of motion and neck supple.   Skin:     General: Skin is warm.   Neurological:      General: No focal deficit present.      Mental Status: He is alert and oriented to person, place, and time.      Sensory: No sensory deficit.      Deep Tendon Reflexes: Reflexes are normal and symmetric. Reflexes normal.      Comments: Bilateral  diabetic exam is normal, visualized feet and the appearance is normal.  Bilateral /sensation of both feet is normal.  Pulsation pedal pulse exam of both lower legs/feet is normal as well.         Psychiatric:         Mood and Affect: Mood normal.         Behavior: Behavior normal.          /74   Pulse 76   Temp 98.6 °F (37 °C) (Temporal)   Resp 16   Ht 6' (1.829 m)   Wt 190 lb (86.2 kg)   BMI 25.77 kg/m²  Estimated body mass index is 25.77 kg/m² as calculated from the following:    Height as of this encounter: 6' (1.829 m).    Weight as of this encounter: 190 lb (86.2 kg).    Medicare Hearing Assessment:   Hearing Screening    Screening Method: Finger Rub               Assessment & Plan:   Lawrence Caban is a 70 year old male who presents for a Medicare Assessment.     1. Medicare annual wellness visit, subsequent:  - Exam is unremarkable. Screening tests were discussed, and after discussion, will check lab work as below. Healthy diet, exercise, and weight were discussed. To call if problems and follow up and further management after testing. Routine follow up.    2. Essential hypertension:  - Stable, Will check lab work as below; Medication reviewed. Follow up and further management after testing. To monitor blood pressure; To call if any persistent elevation of blood pressure; Discussed good diet/activity; Routine follow up in 6-12 months or as needed.     3. Hyperlipidemia, unspecified hyperlipidemia type:  - Stable; No side effects with medication and renewed; To call if problems. Will check lipid panel. Follow up and further  management after labs. Continue good diet and activity. Routine follow up in 6-12 months.    4. Type 2 diabetes mellitus with hyperglycemia, without long-term current use of insulin:  - Stable, continue present management ad follow up with endocrine  -     CBC, Platelet; No Differential  -     Hemoglobin A1C  -     Microalb/Creat Ratio, Random Urine    5. Colon cancer screening:  - After discussion, FIT testing was ordered, Follow up and further management after testing    -     Occult Blood, Fecal, FIT Immunoassay    6. Screening PSA (prostate specific antigen):  - After discussion with patient, pt would like to check PSA blood testing; Follow up and further management after testing    -     PSA Total, Diagnostic    7. History of CVA (cerebrovascular accident) without residual deficits  Other orders:  - Stable, continue present management.    -     Atorvastatin Calcium; Take 1 tablet (80 mg total) by mouth nightly.  Dispense: 90 tablet; Refill: 3            The patient indicates understanding of these issues and agrees to the plan.  Reinforced healthy diet, lifestyle, and exercise.      No follow-ups on file.     Ney Luna MD, 5/28/2025     Supplementary Documentation:   General Health:  In the past six months, have you lost more than 10 pounds without trying?: 2 - No  Has your appetite been poor?: No  Type of Diet: Balanced  How does the patient maintain a good energy level?: Daily Walks  How would you describe your daily physical activity?: Light  How would you describe your current health state?: Fair  How do you maintain positive mental well-being?: Social Interaction, Visiting Family  On a scale of 0 to 10, with 0 being no pain and 10 being severe pain, what is your pain level?: 3 - (Mild)  In the past six months, have you experienced urine leakage?: 0-No  At any time do you feel concerned for the safety/well-being of yourself and/or your children, in your home or elsewhere?: No  Have you had any  immunizations at another office such as Influenza, Hepatitis B, Tetanus, or Pneumococcal?: No    Health Maintenance   Topic Date Due    Colorectal Cancer Screening  Never done    Pneumococcal Vaccine: 50+ Years (1 of 2 - PCV) Never done    Zoster Vaccines (1 of 2) Never done    Diabetes Care Foot Exam  06/14/2024    COVID-19 Vaccine (4 - 2024-25 season) 09/01/2024    HTN: BP Follow-Up  11/05/2024    Annual Well Visit  01/01/2025    Annual Depression Screening  01/01/2025    Diabetes Care: Microalb/Creat Ratio (Annual)  01/01/2025    Diabetes Care A1C  04/05/2025    Diabetes Care Dilated Eye Exam  04/23/2025    Diabetes Care: GFR  06/01/2025    Influenza Vaccine (Season Ended) 10/01/2025    PSA  06/01/2026    Fall Risk Screening (Annual)  Completed    Meningococcal B Vaccine  Aged Out            [1]   Patient Active Problem List  Diagnosis    Type 2 diabetes mellitus with hyperglycemia, without long-term current use of insulin (HCC)    Dyslipidemia    Hypertension    Diabetes mellitus type 2 without retinopathy (HCC)   [2]   Outpatient Medications Marked as Taking for the 5/28/25 encounter (Office Visit) with Ney Luna MD   Medication Sig    atorvastatin 80 MG Oral Tab Take 1 tablet (80 mg total) by mouth nightly.    metFORMIN HCl 1000 MG Oral Tab Take 1 tablet (1,000 mg total) by mouth 2 (two) times daily with meals.    amLODIPine 5 MG Oral Tab Take 1 tablet (5 mg total) by mouth daily.    Accu-Chek Softclix Lancets Does not apply Misc TEST TWICE DAILY    Glucose Blood (ACCU-CHEK GUIDE) In Vitro Strip 1 strip by In Vitro route 2 (two) times daily.    lisinopril 30 MG Oral Tab Take 1 tablet (30 mg total) by mouth daily.    Blood Glucose Monitoring Suppl Supplies Does not apply Misc Use as directed to check blood glucose 2 times daily (fasting and 2 hours after biggest meal)    Blood Glucose Monitoring Suppl Does not apply Kit Use as directed to check blood glucose 2 times daily (fasting and 2 hours after  biggest meal)    aspirin 81 MG Oral Tab EC Take 1 tablet (81 mg total) by mouth daily.    Blood Glucose Monitoring Suppl Does not apply Misc Use as directed to check blood glucose 2 times daily (fasting and 2 hours after biggest meal)

## 2025-06-07 ENCOUNTER — LAB ENCOUNTER (OUTPATIENT)
Dept: LAB | Age: 70
End: 2025-06-07
Attending: FAMILY MEDICINE
Payer: MEDICARE

## 2025-06-07 LAB
CREAT UR-SCNC: 53 MG/DL
DEPRECATED RDW RBC AUTO: 41.3 FL (ref 35.1–46.3)
ERYTHROCYTE [DISTWIDTH] IN BLOOD BY AUTOMATED COUNT: 11.9 % (ref 11–15)
EST. AVERAGE GLUCOSE BLD GHB EST-MCNC: 143 MG/DL (ref 68–126)
HBA1C MFR BLD: 6.6 % (ref ?–5.7)
HCT VFR BLD AUTO: 41 % (ref 39–53)
HGB BLD-MCNC: 14.5 G/DL (ref 13–17.5)
MCH RBC QN AUTO: 32.9 PG (ref 26–34)
MCHC RBC AUTO-ENTMCNC: 35.4 G/DL (ref 31–37)
MCV RBC AUTO: 93 FL (ref 80–100)
MICROALBUMIN UR-MCNC: 1.4 MG/DL
MICROALBUMIN/CREAT 24H UR-RTO: 26.4 UG/MG (ref ?–30)
PLATELET # BLD AUTO: 261 10(3)UL (ref 150–450)
PSA SERPL-MCNC: 0.99 NG/ML (ref ?–4)
RBC # BLD AUTO: 4.41 X10(6)UL (ref 3.8–5.8)
WBC # BLD AUTO: 5.2 X10(3) UL (ref 4–11)

## 2025-06-07 PROCEDURE — 85027 COMPLETE CBC AUTOMATED: CPT | Performed by: FAMILY MEDICINE

## 2025-06-07 PROCEDURE — 82043 UR ALBUMIN QUANTITATIVE: CPT | Performed by: FAMILY MEDICINE

## 2025-06-07 PROCEDURE — 36415 COLL VENOUS BLD VENIPUNCTURE: CPT | Performed by: FAMILY MEDICINE

## 2025-06-07 PROCEDURE — 82570 ASSAY OF URINE CREATININE: CPT | Performed by: FAMILY MEDICINE

## 2025-06-07 PROCEDURE — 84153 ASSAY OF PSA TOTAL: CPT | Performed by: FAMILY MEDICINE

## 2025-06-07 PROCEDURE — 83036 HEMOGLOBIN GLYCOSYLATED A1C: CPT | Performed by: FAMILY MEDICINE

## 2025-06-10 DIAGNOSIS — E11.65 TYPE 2 DIABETES MELLITUS WITH HYPERGLYCEMIA, WITHOUT LONG-TERM CURRENT USE OF INSULIN (HCC): ICD-10-CM

## 2025-06-11 RX ORDER — LANCETS
EACH MISCELLANEOUS
Qty: 200 EACH | Refills: 1 | Status: SHIPPED | OUTPATIENT
Start: 2025-06-11

## 2025-06-11 NOTE — TELEPHONE ENCOUNTER
Endocrine Refill protocol for Glucose testing supplies     Protocol Criteria: PASSED Reason: N/A    If below requirement is met, send a 90-day supply with 1 refill per provider protocol.    Verify appointment with Endocrinology completed in the last 6 months or scheduled in the next 3 months.    Last completed office visit:10/5/2024 Shira Jeronimo MD   Last completed telemed visit: Visit date not found  Next scheduled Follow up: No future appointments.

## 2025-07-09 DIAGNOSIS — E11.65 TYPE 2 DIABETES MELLITUS WITH HYPERGLYCEMIA, WITHOUT LONG-TERM CURRENT USE OF INSULIN (HCC): ICD-10-CM

## 2025-07-10 NOTE — TELEPHONE ENCOUNTER
Endocrine Refill protocol for Glucose testing supplies     Protocol Criteria: PASSED Reason: N/A    If below requirement is met, send a 90-day supply with 1 refill per provider protocol.    Verify appointment with Endocrinology completed in the last 6 months or scheduled in the next 3 months.    Last completed office visit:10/5/2024 Shira Jeronimo MD   Last completed telemed visit: Visit date not found  Next scheduled Follow up:   Future Appointments   Date Time Provider Department Center   10/29/2025  4:40 PM [facility]

## 2025-07-28 DIAGNOSIS — E11.65 TYPE 2 DIABETES MELLITUS WITH HYPERGLYCEMIA, WITHOUT LONG-TERM CURRENT USE OF INSULIN (HCC): ICD-10-CM

## 2025-08-21 RX ORDER — LISINOPRIL 30 MG/1
30 TABLET ORAL DAILY
Qty: 90 TABLET | Refills: 3 | Status: SHIPPED | OUTPATIENT
Start: 2025-08-21

## (undated) DIAGNOSIS — E11.65 TYPE 2 DIABETES MELLITUS WITH HYPERGLYCEMIA, WITHOUT LONG-TERM CURRENT USE OF INSULIN (HCC): Primary | ICD-10-CM

## (undated) NOTE — LETTER
April 23, 2024      No Recipients     Patient: Lawrence Caban   YOB: 1955   Date of Visit: 4/23/2024       Dear Dr. Shepherd Recipients:    Thank you for referring Lawrence Caban to me for evaluation. Here is my assessment and plan of care:    Lawrenec Caban is a 69 year old male.    HPI:     HPI    Pt in today for a diabetic eye exam. Pt states vision is stable with his current glasses.      Pt has been a diabetic for 2 years      Pt's diabetes is currently controlled by pills   Pt checks BS 1-2x a day   Pt's last blood sugar was 104 yesterday afternoon  Last HA1C was 6.2 on 6/03/23  Endocrinologist: Dr. Jeronimo   Last edited by Lo Olsen OT on 4/23/2024  4:32 PM.        Patient History:  Past Medical History:    Diabetes (HCC)       Surgical History: Lawrence Caban has no past surgical history on file.    Family History   Problem Relation Age of Onset    Heart Disorder Mother     Diabetes Mother     Heart Disorder Father     Glaucoma Neg     Macular degeneration Neg        Social History:   Social History     Socioeconomic History    Marital status:    Tobacco Use    Smoking status: Never    Smokeless tobacco: Never   Substance and Sexual Activity    Alcohol use: Yes    Drug use: Never       Medications:  Current Outpatient Medications   Medication Sig Dispense Refill    Blood Glucose Monitoring Suppl Supplies Does not apply Misc Use as directed to check blood glucose 2 times daily (fasting and 2 hours after biggest meal) 200 each 1    Accu-Chek Softclix Lancets Does not apply Misc Please check blood sugars twice daily 200 each 1    atorvastatin 80 MG Oral Tab Take 1 tablet (80 mg total) by mouth nightly. 90 tablet 3    metFORMIN HCl 1000 MG Oral Tab Take 1 tablet (1,000 mg total) by mouth 2 (two) times daily with meals. 180 tablet 1    amLODIPine 5 MG Oral Tab Take 1 tablet (5 mg total) by mouth daily. 90 tablet 1    lisinopril 30 MG Oral Tab Take 1 tablet (30 mg total) by mouth  daily. 90 tablet 3    Blood Glucose Monitoring Suppl Does not apply Kit Use as directed to check blood glucose 2 times daily (fasting and 2 hours after biggest meal) 1 kit 0    aspirin 81 MG Oral Tab EC Take 1 tablet (81 mg total) by mouth daily.  0    Blood Glucose Monitoring Suppl Does not apply Misc Use as directed to check blood glucose 2 times daily (fasting and 2 hours after biggest meal) 200 each 0       Allergies:  No Known Allergies    ROS:     ROS    Positive for: Eyes  Last edited by Lo Olsen OT on 4/23/2024  4:27 PM.          PHYSICAL EXAM:     Base Eye Exam       Visual Acuity (Snellen - Linear)         Right Left    Dist cc 20/25 +2 20/25 -2    Near cc 20/25 20/25      Correction: Glasses              Tonometry (Icare, 4:41 PM)         Right Left    Pressure 15 17              Pupils         Pupils    Right PERRL    Left PERRL              Extraocular Movement         Right Left     Full, Ortho Full, Ortho              Neuro/Psych       Oriented x3: Yes    Mood/Affect: Normal              Dilation       Both eyes: 1.0% Mydriacyl and 2.5% Jermain Synephrine @ 4:43 PM                  Slit Lamp and Fundus Exam       Slit Lamp Exam         Right Left    Lids/Lashes Dermatochalasis, Meibomian gland dysfunction Dermatochalasis, Meibomian gland dysfunction    Conjunctiva/Sclera Nasal/temp pinguecula Nasal/temp pinguecula    Cornea 2+ Arcus 2+ Arcus    Anterior Chamber Deep and quiet Deep and quiet    Iris Normal Normal    Lens 2+ Nuclear sclerosis 2+ Nuclear sclerosis    Vitreous Clear Clear              Fundus Exam         Right Left    Disc Good rim, Temporal crescent Good rim, Temporal crescent    C/D Ratio 0.4 0.5    Macula Normal, no BDR Normal, no BDR    Vessels Normal Normal    Periphery Normal Normal                  Refraction       Wearing Rx         Sphere Cylinder Axis Add    Right +0.50 +0.75 170 +2.25    Left +1.50 +0.75 180 +2.25      Age: 5yrs    Type: Progressive bifocal   Patient  does not want a refraction                    ASSESSMENT/PLAN:     Diagnoses and Plan:     Diabetes mellitus type 2 without retinopathy (HCC)  Diabetes type II: no background of retinopathy, no signs of neovascularization noted.  Discussed ocular and systemic benefits of blood sugar control.  Diagnosis and treatment discussed in detail with patient.    Will see patient in 1 year for a diabetic exam    Age-related nuclear cataract of both eyes  Discussed early cataracts with patient. Told patient that cataracts are age appropriate and they are not surgical at this time.  No treatment recommended at this time.     Continue with same glasses Rx.       No orders of the defined types were placed in this encounter.      Meds This Visit:  Requested Prescriptions      No prescriptions requested or ordered in this encounter        Follow up instructions:  Return in about 1 year (around 4/23/2025) for Diabetic eye exam.    4/23/2024  Scribed by: Colt Hill MD        If you have questions, please do not hesitate to call me. I look forward to following Lawrence along with you.    Sincerely,        Colt Hill MD        CC:   No Recipients    Document electronically generated by: Colt Hill MD

## (undated) NOTE — LETTER
10/18/22          Sparta Kim  :  1955      To Whom It May Concern: This patient was seen in our office on 10/18/22 and is currently under my care. He is cleared to return to work and to continue to drive. If you have any other questions or concerns please reach out to our office. Sincerely,        Drake Sherman M.D.       Neurology

## (undated) NOTE — IP AVS SNAPSHOT
[facility]            (For Outpatient Use Only) Initial Admit Date: 2022   Inpt/Obs Admit Date: Inpt: 22 / Obs: N/A   Discharge Date:    Lindsey Hoffman:  [de-identified]   MRN: [de-identified]   CSN: [id number]   CEID: [id number]        ENCOUNTER  Patient Class: Inpatient Admitting Provider: Gregorio Florence MD Unit: [address] [place] [address]   Hospital Service: Cardiac Telemetry Attending Provider: Billy Daigle MD   Bed: 343-A   Visit Type:   Referring Physician: No ref. provider found Billing Flag:    Admit Diagnosis: Hypertensive urgency [I16.0]      PATIENT  Legal Name:   Jen Felix   Legal Sex: Male  Gender ID:              [address] [place] [address],[address] Name:    PCP:  Dionisio Casiano MD Home: [phone]   Address:  [facility] : [address] (67 yrs) Mobile: [phone]         City/State/Zip: [facility] [address] Marital:  Language: Dimas AdamsChristy Reasons SSN4: [id number] Uatsdin:      Race: White Ethnicity: Non  Or [address] [place] [address]   Name Relationship Legal Guardian? Home Phone Work Phone Mobile Phone   1.  Citrus  2. *No Contact Specified* Spouse   No         [phone]       GUARANTOR  GuarantorMjatin Espinoza : 1955 Home Phone: [phone]   Address: [facility]  Sex: Male Work Phone:    City/State/Zip: [address]   Rel. to Patient: Self Guarantor ID: [id number]   GUARANTOR EMPLOYER   Employer: [employer] Status: FULL TIME     COVERAGE  PRIMARY INSURANCE   Payor: HU[employer] MCR Plan: HU[employer]NA MEDICARE ADV PPO   Group Number: [id number] Insurance Type: [address] Name: Gibran Philip : 1955   Subscriber ID: [id number] Pt Rel to Subscriber: Self   SECONDARY INSURANCE   Payor:  Plan:    Group Number:  Insurance Type:    Subscriber Name:  Subscriber :    Subscriber ID:  Pt Rel to Subscriber:    TERTIARY INSURANCE   Payor:  Plan:    Group Number:  Insurance Type:    Subscriber Name:  Subscriber :    Subscriber ID:  Pt Rel to Subscriber: Hospital Account Financial Class: Medicare Advantage    September 13, 2022

## (undated) NOTE — LETTER
April 25, 2023      No Recipients     Patient: Behzad Jensen   YOB: 1955   Date of Visit: 4/25/2023       Dear Dr. Andrés Garcia Recipients: Thank you for referring Hue Vincent to me for evaluation. Here is my assessment and plan of care:    Behzad Jensen is a 79year old male. HPI:     HPI    NP. Pt in today for a diabetic eye exam. Pt's last eye exam was over a year ago at [facility] and was prescribed glasses. Pt states vision is stable with his current glasses and denies any surgeries done to the eyes. Pt has been a diabetic for 1-2 year       Pt's diabetes is currently controlled by pills   Pt checks BS 1-2x a day   Pt's last blood sugar was 120 yesterday  Last HA1C was 6.6 on 9/8/22  Endocrinologist: Dr. Sharif Mendoza      Consult: per Dr. Silvestre Jameson   Last edited by Moses Hagan OT on 4/25/2023  4:36 PM.        Patient History:  Past Medical History:   Diagnosis Date    Diabetes [facility])        Surgical History: Behzad Jensen has no past surgical history on file. Family History   Problem Relation Age of Onset    Heart Disorder Mother     Diabetes Mother     Heart Disorder Father     Glaucoma Neg     Macular degeneration Neg        Social History:   Social History     Socioeconomic History    Marital status:    Tobacco Use    Smoking status: Never    Smokeless tobacco: Never   Substance and Sexual Activity    Alcohol use: Yes    Drug use: Never       Medications:  Current Outpatient Medications   Medication Sig Dispense Refill    lisinopril 10 MG Oral Tab Take 1 tablet (10 mg total) by mouth daily. 90 tablet 3    METFORMIN HCL 1000 MG Oral Tab TAKE 1 TABLET(1000 MG) BY MOUTH TWICE DAILY WITH MEALS 180 tablet 0    atorvastatin 80 MG Oral Tab Take 1 tablet (80 mg total) by mouth nightly. 90 tablet 1    Accu-Chek Softclix Lancets Does not apply Misc Please check blood sugars twice daily 200 each 1    aspirin 81 MG Oral Tab EC Take 1 tablet (81 mg total) by mouth daily.   0 Blood Glucose Monitoring Suppl Does not apply Kit Use as directed to check blood glucose 2 times daily (fasting and 2 hours after biggest meal) 1 kit 0    Blood Glucose Monitoring Suppl Supplies Does not apply Misc Use as directed to check blood glucose 2 times daily (fasting and 2 hours after biggest meal) 200 each 0    Blood Glucose Monitoring Suppl Does not apply Misc Use as directed to check blood glucose 2 times daily (fasting and 2 hours after biggest meal) 200 each 0       Allergies:  No Known Allergies    ROS:     ROS    Positive for: Endocrine, Eyes  Negative for: Constitutional, Gastrointestinal, Neurological, Skin, Genitourinary, Musculoskeletal, HENT, Cardiovascular, Respiratory, Psychiatric, Allergic/Imm, Heme/Lymph  Last edited by Kam Novak OT on 4/25/2023  4:06 PM.          PHYSICAL EXAM:     Base Eye Exam       Visual Acuity (Snellen - Linear)         Right Left    Dist cc 20/25 +2 20/25 -3    Near cc 20/25 20/25      Correction: Glasses              Tonometry (Icare, 4:41 PM)         Right Left    Pressure 13 12              Pupils         Pupils    Right PERRL    Left PERRL              Visual Fields         Left Right     Full Full              Extraocular Movement         Right Left     Full, Ortho Full, Ortho              Dilation       Both eyes: 1.0% Mydriacyl and 2.5% Jermain Synephrine @ 4:41 PM                  Slit Lamp and Fundus Exam       Slit Lamp Exam         Right Left    Lids/Lashes Dermatochalasis, Meibomian gland dysfunction Dermatochalasis, Meibomian gland dysfunction    Conjunctiva/Sclera Nasal/temp pinguecula Nasal/temp pinguecula    Cornea 2+ Arcus 2+ Arcus    Anterior Chamber Deep and quiet Deep and quiet    Iris Normal Normal    Lens 1+ Nuclear sclerosis 1+ Nuclear sclerosis    Vitreous Clear Clear              Fundus Exam         Right Left    Disc Good rim, Temporal crescent Good rim, Temporal crescent    C/D Ratio 0.4 0.5    Macula Normal, no BDR Normal, no BDR Vessels Normal Normal    Periphery Normal Normal                  Refraction       Wearing Rx         Sphere Cylinder Axis Add    Right +0.50 +0.75 170 +2.25    Left +1.50 +0.75 180 +2.25      Age: 1.5yr    Type: Progressive bifocal              Manifest Refraction (Auto)         Sphere Cylinder Axis    Right +1.25 +0.75 170    Left +2.25 +1.00 170   Pt declines refraction, happy with his glasses                    ASSESSMENT/PLAN:     Diagnoses and Plan:     Diabetes mellitus type 2 without retinopathy ([address]  Diabetes type II: no background of retinopathy, no signs of neovascularization noted. Discussed ocular and systemic benefits of blood sugar control. Diagnosis and treatment discussed in detail with patient. Age-related nuclear cataract of both eyes  Discussed early cataracts with patient. Told patient that cataracts are age appropriate and they are not surgical at this time. No treatment recommended at this time. Continue with same glasses Rx. No orders of the defined types were placed in this encounter. Meds This Visit:  Requested Prescriptions      No prescriptions requested or ordered in this encounter        Follow up instructions:  Return in about 1 year (around 4/25/2024) for DM Eye Exam.    4/25/2023  Scribed by: Yulia Enriquez MD      If you have questions, please do not hesitate to call me. I look forward to [facility] along with you.     Sincerely,        Yulia Enriquez MD        CC:   No Recipients    Document electronically generated by: Yulia Enriquez MD

## (undated) NOTE — LETTER
[address] [place]  [address]  Phone: [phone]  Fax: [phone]       Hello,     This is the [facility], office of Dr. Ney Luna.    Thank you for putting your trust in [facility].  Our goal is to deliver the highest quality healthcare and an exceptional patient experience. Review of your medical record shows you are due for the following:         Annual Medicare Physical         Please call [phone] to schedule your appointment or schedule online via [employer].     If you changed to a new provider at another facility, please notify the clinic to update your records.    If you had any recent testing at another facility, please have your results faxed to our office at [phone].     Thank you and have a great day!